# Patient Record
Sex: MALE | Race: WHITE | NOT HISPANIC OR LATINO | Employment: FULL TIME | URBAN - METROPOLITAN AREA
[De-identification: names, ages, dates, MRNs, and addresses within clinical notes are randomized per-mention and may not be internally consistent; named-entity substitution may affect disease eponyms.]

---

## 2017-01-09 ENCOUNTER — OFFICE VISIT - HEALTHEAST (OUTPATIENT)
Dept: BEHAVIORAL HEALTH | Facility: CLINIC | Age: 42
End: 2017-01-09

## 2017-01-09 DIAGNOSIS — F33.1 MAJOR DEPRESSIVE DISORDER, RECURRENT EPISODE, MODERATE WITH ANXIOUS DISTRESS (H): ICD-10-CM

## 2017-01-09 DIAGNOSIS — F10.10 ALCOHOL ABUSE: ICD-10-CM

## 2017-01-16 ENCOUNTER — AMBULATORY - HEALTHEAST (OUTPATIENT)
Dept: BEHAVIORAL HEALTH | Facility: CLINIC | Age: 42
End: 2017-01-16

## 2017-01-23 ENCOUNTER — OFFICE VISIT - HEALTHEAST (OUTPATIENT)
Dept: BEHAVIORAL HEALTH | Facility: CLINIC | Age: 42
End: 2017-01-23

## 2017-01-23 DIAGNOSIS — F10.10 ALCOHOL ABUSE: ICD-10-CM

## 2017-01-23 DIAGNOSIS — F33.1 MAJOR DEPRESSIVE DISORDER, RECURRENT EPISODE, MODERATE WITH ANXIOUS DISTRESS (H): ICD-10-CM

## 2017-02-13 ENCOUNTER — OFFICE VISIT - HEALTHEAST (OUTPATIENT)
Dept: BEHAVIORAL HEALTH | Facility: CLINIC | Age: 42
End: 2017-02-13

## 2017-02-13 ENCOUNTER — AMBULATORY - HEALTHEAST (OUTPATIENT)
Dept: BEHAVIORAL HEALTH | Facility: CLINIC | Age: 42
End: 2017-02-13

## 2017-02-13 DIAGNOSIS — F33.1 MAJOR DEPRESSIVE DISORDER, RECURRENT EPISODE, MODERATE WITH ANXIOUS DISTRESS (H): ICD-10-CM

## 2017-02-27 ENCOUNTER — OFFICE VISIT - HEALTHEAST (OUTPATIENT)
Dept: BEHAVIORAL HEALTH | Facility: CLINIC | Age: 42
End: 2017-02-27

## 2017-02-27 DIAGNOSIS — F33.1 MAJOR DEPRESSIVE DISORDER, RECURRENT EPISODE, MODERATE WITH ANXIOUS DISTRESS (H): ICD-10-CM

## 2017-04-03 ENCOUNTER — OFFICE VISIT - HEALTHEAST (OUTPATIENT)
Dept: BEHAVIORAL HEALTH | Facility: CLINIC | Age: 42
End: 2017-04-03

## 2017-04-03 DIAGNOSIS — F33.1 MAJOR DEPRESSIVE DISORDER, RECURRENT EPISODE, MODERATE WITH ANXIOUS DISTRESS (H): ICD-10-CM

## 2017-04-03 DIAGNOSIS — F10.10 ALCOHOL ABUSE: ICD-10-CM

## 2017-04-17 ENCOUNTER — OFFICE VISIT - HEALTHEAST (OUTPATIENT)
Dept: BEHAVIORAL HEALTH | Facility: CLINIC | Age: 42
End: 2017-04-17

## 2017-04-17 DIAGNOSIS — F33.1 MAJOR DEPRESSIVE DISORDER, RECURRENT EPISODE, MODERATE WITH ANXIOUS DISTRESS (H): ICD-10-CM

## 2017-04-17 DIAGNOSIS — F10.10 ALCOHOL ABUSE: ICD-10-CM

## 2017-05-01 ENCOUNTER — OFFICE VISIT - HEALTHEAST (OUTPATIENT)
Dept: BEHAVIORAL HEALTH | Facility: CLINIC | Age: 42
End: 2017-05-01

## 2017-05-01 DIAGNOSIS — F10.10 ALCOHOL ABUSE: ICD-10-CM

## 2017-05-01 DIAGNOSIS — F33.1 MAJOR DEPRESSIVE DISORDER, RECURRENT EPISODE, MODERATE WITH ANXIOUS DISTRESS (H): ICD-10-CM

## 2017-06-05 ENCOUNTER — OFFICE VISIT - HEALTHEAST (OUTPATIENT)
Dept: BEHAVIORAL HEALTH | Facility: CLINIC | Age: 42
End: 2017-06-05

## 2017-06-05 DIAGNOSIS — F33.1 MAJOR DEPRESSIVE DISORDER, RECURRENT EPISODE, MODERATE WITH ANXIOUS DISTRESS (H): ICD-10-CM

## 2017-06-12 ENCOUNTER — OFFICE VISIT - HEALTHEAST (OUTPATIENT)
Dept: BEHAVIORAL HEALTH | Facility: CLINIC | Age: 42
End: 2017-06-12

## 2017-06-12 DIAGNOSIS — F33.1 MAJOR DEPRESSIVE DISORDER, RECURRENT EPISODE, MODERATE WITH ANXIOUS DISTRESS (H): ICD-10-CM

## 2017-06-12 DIAGNOSIS — F10.10 ALCOHOL ABUSE: ICD-10-CM

## 2017-06-26 ENCOUNTER — OFFICE VISIT - HEALTHEAST (OUTPATIENT)
Dept: BEHAVIORAL HEALTH | Facility: CLINIC | Age: 42
End: 2017-06-26

## 2017-06-26 DIAGNOSIS — F33.1 MAJOR DEPRESSIVE DISORDER, RECURRENT EPISODE, MODERATE WITH ANXIOUS DISTRESS (H): ICD-10-CM

## 2017-06-26 DIAGNOSIS — F10.10 ALCOHOL ABUSE: ICD-10-CM

## 2017-07-10 ENCOUNTER — OFFICE VISIT - HEALTHEAST (OUTPATIENT)
Dept: BEHAVIORAL HEALTH | Facility: CLINIC | Age: 42
End: 2017-07-10

## 2017-07-10 DIAGNOSIS — F10.10 ALCOHOL ABUSE: ICD-10-CM

## 2017-07-10 DIAGNOSIS — F33.1 MAJOR DEPRESSIVE DISORDER, RECURRENT EPISODE, MODERATE WITH ANXIOUS DISTRESS (H): ICD-10-CM

## 2017-07-17 ENCOUNTER — COMMUNICATION - HEALTHEAST (OUTPATIENT)
Dept: BEHAVIORAL HEALTH | Facility: CLINIC | Age: 42
End: 2017-07-17

## 2017-07-30 ENCOUNTER — AMBULATORY - HEALTHEAST (OUTPATIENT)
Dept: BEHAVIORAL HEALTH | Facility: CLINIC | Age: 42
End: 2017-07-30

## 2017-07-31 ENCOUNTER — OFFICE VISIT - HEALTHEAST (OUTPATIENT)
Dept: BEHAVIORAL HEALTH | Facility: CLINIC | Age: 42
End: 2017-07-31

## 2017-07-31 DIAGNOSIS — F10.10 ALCOHOL ABUSE: ICD-10-CM

## 2017-07-31 DIAGNOSIS — F33.1 MAJOR DEPRESSIVE DISORDER, RECURRENT EPISODE, MODERATE WITH ANXIOUS DISTRESS (H): ICD-10-CM

## 2017-08-07 ENCOUNTER — OFFICE VISIT - HEALTHEAST (OUTPATIENT)
Dept: BEHAVIORAL HEALTH | Facility: CLINIC | Age: 42
End: 2017-08-07

## 2017-08-07 DIAGNOSIS — F10.10 ALCOHOL ABUSE: ICD-10-CM

## 2017-08-07 DIAGNOSIS — F33.1 MAJOR DEPRESSIVE DISORDER, RECURRENT EPISODE, MODERATE WITH ANXIOUS DISTRESS (H): ICD-10-CM

## 2017-08-14 ENCOUNTER — OFFICE VISIT - HEALTHEAST (OUTPATIENT)
Dept: BEHAVIORAL HEALTH | Facility: CLINIC | Age: 42
End: 2017-08-14

## 2017-08-14 DIAGNOSIS — F33.1 MAJOR DEPRESSIVE DISORDER, RECURRENT EPISODE, MODERATE WITH ANXIOUS DISTRESS (H): ICD-10-CM

## 2017-08-14 DIAGNOSIS — F10.10 ALCOHOL ABUSE: ICD-10-CM

## 2017-08-28 ENCOUNTER — OFFICE VISIT - HEALTHEAST (OUTPATIENT)
Dept: BEHAVIORAL HEALTH | Facility: CLINIC | Age: 42
End: 2017-08-28

## 2017-08-28 DIAGNOSIS — F33.1 MAJOR DEPRESSIVE DISORDER, RECURRENT EPISODE, MODERATE WITH ANXIOUS DISTRESS (H): ICD-10-CM

## 2017-09-11 ENCOUNTER — OFFICE VISIT - HEALTHEAST (OUTPATIENT)
Dept: BEHAVIORAL HEALTH | Facility: CLINIC | Age: 42
End: 2017-09-11

## 2017-09-11 DIAGNOSIS — F10.10 ALCOHOL ABUSE: ICD-10-CM

## 2017-09-11 DIAGNOSIS — F33.1 MAJOR DEPRESSIVE DISORDER, RECURRENT EPISODE, MODERATE WITH ANXIOUS DISTRESS (H): ICD-10-CM

## 2017-10-02 ENCOUNTER — OFFICE VISIT - HEALTHEAST (OUTPATIENT)
Dept: BEHAVIORAL HEALTH | Facility: CLINIC | Age: 42
End: 2017-10-02

## 2017-10-02 DIAGNOSIS — F33.1 MAJOR DEPRESSIVE DISORDER, RECURRENT EPISODE, MODERATE WITH ANXIOUS DISTRESS (H): ICD-10-CM

## 2017-10-16 ENCOUNTER — OFFICE VISIT - HEALTHEAST (OUTPATIENT)
Dept: BEHAVIORAL HEALTH | Facility: CLINIC | Age: 42
End: 2017-10-16

## 2017-10-16 DIAGNOSIS — F33.1 MAJOR DEPRESSIVE DISORDER, RECURRENT EPISODE, MODERATE WITH ANXIOUS DISTRESS (H): ICD-10-CM

## 2017-10-16 DIAGNOSIS — F10.10 ALCOHOL ABUSE: ICD-10-CM

## 2017-10-30 ENCOUNTER — OFFICE VISIT - HEALTHEAST (OUTPATIENT)
Dept: BEHAVIORAL HEALTH | Facility: CLINIC | Age: 42
End: 2017-10-30

## 2017-10-30 DIAGNOSIS — F33.1 MAJOR DEPRESSIVE DISORDER, RECURRENT EPISODE, MODERATE WITH ANXIOUS DISTRESS (H): ICD-10-CM

## 2017-10-30 DIAGNOSIS — F10.10 ALCOHOL ABUSE: ICD-10-CM

## 2017-11-06 ENCOUNTER — OFFICE VISIT - HEALTHEAST (OUTPATIENT)
Dept: BEHAVIORAL HEALTH | Facility: CLINIC | Age: 42
End: 2017-11-06

## 2017-11-06 DIAGNOSIS — F33.1 MAJOR DEPRESSIVE DISORDER, RECURRENT EPISODE, MODERATE WITH ANXIOUS DISTRESS (H): ICD-10-CM

## 2017-11-06 DIAGNOSIS — F10.10 ALCOHOL ABUSE: ICD-10-CM

## 2017-11-20 ENCOUNTER — OFFICE VISIT - HEALTHEAST (OUTPATIENT)
Dept: BEHAVIORAL HEALTH | Facility: CLINIC | Age: 42
End: 2017-11-20

## 2017-11-20 DIAGNOSIS — F10.10 ALCOHOL ABUSE: ICD-10-CM

## 2017-11-20 DIAGNOSIS — F33.1 MAJOR DEPRESSIVE DISORDER, RECURRENT EPISODE, MODERATE WITH ANXIOUS DISTRESS (H): ICD-10-CM

## 2017-12-04 ENCOUNTER — OFFICE VISIT - HEALTHEAST (OUTPATIENT)
Dept: BEHAVIORAL HEALTH | Facility: CLINIC | Age: 42
End: 2017-12-04

## 2017-12-04 DIAGNOSIS — F10.10 ALCOHOL ABUSE: ICD-10-CM

## 2017-12-04 DIAGNOSIS — F33.1 MAJOR DEPRESSIVE DISORDER, RECURRENT EPISODE, MODERATE WITH ANXIOUS DISTRESS (H): ICD-10-CM

## 2017-12-18 ENCOUNTER — AMBULATORY - HEALTHEAST (OUTPATIENT)
Dept: BEHAVIORAL HEALTH | Facility: CLINIC | Age: 42
End: 2017-12-18

## 2017-12-18 ENCOUNTER — OFFICE VISIT - HEALTHEAST (OUTPATIENT)
Dept: BEHAVIORAL HEALTH | Facility: CLINIC | Age: 42
End: 2017-12-18

## 2017-12-18 DIAGNOSIS — F10.10 ALCOHOL ABUSE: ICD-10-CM

## 2017-12-18 DIAGNOSIS — F33.1 MAJOR DEPRESSIVE DISORDER, RECURRENT EPISODE, MODERATE WITH ANXIOUS DISTRESS (H): ICD-10-CM

## 2018-01-08 ENCOUNTER — OFFICE VISIT - HEALTHEAST (OUTPATIENT)
Dept: BEHAVIORAL HEALTH | Facility: CLINIC | Age: 43
End: 2018-01-08

## 2018-01-08 DIAGNOSIS — F33.1 MAJOR DEPRESSIVE DISORDER, RECURRENT EPISODE, MODERATE WITH ANXIOUS DISTRESS (H): ICD-10-CM

## 2018-01-08 DIAGNOSIS — F10.10 ALCOHOL ABUSE: ICD-10-CM

## 2018-01-29 ENCOUNTER — OFFICE VISIT - HEALTHEAST (OUTPATIENT)
Dept: BEHAVIORAL HEALTH | Facility: CLINIC | Age: 43
End: 2018-01-29

## 2018-01-29 DIAGNOSIS — F10.10 ALCOHOL ABUSE: ICD-10-CM

## 2018-01-29 DIAGNOSIS — F33.1 MAJOR DEPRESSIVE DISORDER, RECURRENT EPISODE, MODERATE WITH ANXIOUS DISTRESS (H): ICD-10-CM

## 2018-02-12 ENCOUNTER — AMBULATORY - HEALTHEAST (OUTPATIENT)
Dept: BEHAVIORAL HEALTH | Facility: CLINIC | Age: 43
End: 2018-02-12

## 2018-02-26 ENCOUNTER — OFFICE VISIT - HEALTHEAST (OUTPATIENT)
Dept: BEHAVIORAL HEALTH | Facility: CLINIC | Age: 43
End: 2018-02-26

## 2018-02-26 DIAGNOSIS — F10.10 ALCOHOL ABUSE: ICD-10-CM

## 2018-02-26 DIAGNOSIS — F33.1 MAJOR DEPRESSIVE DISORDER, RECURRENT EPISODE, MODERATE WITH ANXIOUS DISTRESS (H): ICD-10-CM

## 2018-03-26 ENCOUNTER — OFFICE VISIT - HEALTHEAST (OUTPATIENT)
Dept: BEHAVIORAL HEALTH | Facility: CLINIC | Age: 43
End: 2018-03-26

## 2018-03-26 DIAGNOSIS — F33.1 MAJOR DEPRESSIVE DISORDER, RECURRENT EPISODE, MODERATE WITH ANXIOUS DISTRESS (H): ICD-10-CM

## 2018-03-26 DIAGNOSIS — F10.10 ALCOHOL ABUSE: ICD-10-CM

## 2018-04-09 ENCOUNTER — OFFICE VISIT - HEALTHEAST (OUTPATIENT)
Dept: BEHAVIORAL HEALTH | Facility: CLINIC | Age: 43
End: 2018-04-09

## 2018-04-09 DIAGNOSIS — F33.1 MAJOR DEPRESSIVE DISORDER, RECURRENT EPISODE, MODERATE WITH ANXIOUS DISTRESS (H): ICD-10-CM

## 2018-04-09 DIAGNOSIS — F10.10 ALCOHOL ABUSE: ICD-10-CM

## 2018-04-23 ENCOUNTER — OFFICE VISIT - HEALTHEAST (OUTPATIENT)
Dept: BEHAVIORAL HEALTH | Facility: CLINIC | Age: 43
End: 2018-04-23

## 2018-04-23 DIAGNOSIS — F10.10 ALCOHOL ABUSE: ICD-10-CM

## 2018-04-23 DIAGNOSIS — F33.1 MAJOR DEPRESSIVE DISORDER, RECURRENT EPISODE, MODERATE WITH ANXIOUS DISTRESS (H): ICD-10-CM

## 2018-05-07 ENCOUNTER — OFFICE VISIT - HEALTHEAST (OUTPATIENT)
Dept: BEHAVIORAL HEALTH | Facility: CLINIC | Age: 43
End: 2018-05-07

## 2018-05-07 DIAGNOSIS — F33.1 MAJOR DEPRESSIVE DISORDER, RECURRENT EPISODE, MODERATE WITH ANXIOUS DISTRESS (H): ICD-10-CM

## 2018-05-07 DIAGNOSIS — F10.10 ALCOHOL ABUSE: ICD-10-CM

## 2018-05-14 ENCOUNTER — OFFICE VISIT - HEALTHEAST (OUTPATIENT)
Dept: BEHAVIORAL HEALTH | Facility: CLINIC | Age: 43
End: 2018-05-14

## 2018-05-14 DIAGNOSIS — F33.1 MAJOR DEPRESSIVE DISORDER, RECURRENT EPISODE, MODERATE WITH ANXIOUS DISTRESS (H): ICD-10-CM

## 2018-05-14 DIAGNOSIS — F10.10 ALCOHOL ABUSE: ICD-10-CM

## 2018-06-04 ENCOUNTER — OFFICE VISIT - HEALTHEAST (OUTPATIENT)
Dept: BEHAVIORAL HEALTH | Facility: CLINIC | Age: 43
End: 2018-06-04

## 2018-06-04 DIAGNOSIS — F33.1 MAJOR DEPRESSIVE DISORDER, RECURRENT EPISODE, MODERATE WITH ANXIOUS DISTRESS (H): ICD-10-CM

## 2018-08-06 ENCOUNTER — OFFICE VISIT - HEALTHEAST (OUTPATIENT)
Dept: BEHAVIORAL HEALTH | Facility: CLINIC | Age: 43
End: 2018-08-06

## 2018-08-06 DIAGNOSIS — F33.1 MAJOR DEPRESSIVE DISORDER, RECURRENT EPISODE, MODERATE WITH ANXIOUS DISTRESS (H): ICD-10-CM

## 2018-08-06 DIAGNOSIS — F10.10 ALCOHOL ABUSE: ICD-10-CM

## 2018-08-20 ENCOUNTER — OFFICE VISIT - HEALTHEAST (OUTPATIENT)
Dept: BEHAVIORAL HEALTH | Facility: CLINIC | Age: 43
End: 2018-08-20

## 2018-08-20 ENCOUNTER — AMBULATORY - HEALTHEAST (OUTPATIENT)
Dept: BEHAVIORAL HEALTH | Facility: CLINIC | Age: 43
End: 2018-08-20

## 2018-08-20 DIAGNOSIS — F33.1 MAJOR DEPRESSIVE DISORDER, RECURRENT EPISODE, MODERATE WITH ANXIOUS DISTRESS (H): ICD-10-CM

## 2018-09-11 ENCOUNTER — AMBULATORY - HEALTHEAST (OUTPATIENT)
Dept: BEHAVIORAL HEALTH | Facility: CLINIC | Age: 43
End: 2018-09-11

## 2018-09-24 ENCOUNTER — OFFICE VISIT - HEALTHEAST (OUTPATIENT)
Dept: BEHAVIORAL HEALTH | Facility: CLINIC | Age: 43
End: 2018-09-24

## 2018-09-24 DIAGNOSIS — F33.1 MAJOR DEPRESSIVE DISORDER, RECURRENT EPISODE, MODERATE WITH ANXIOUS DISTRESS (H): ICD-10-CM

## 2018-09-24 DIAGNOSIS — F10.10 ALCOHOL ABUSE: ICD-10-CM

## 2018-10-08 ENCOUNTER — OFFICE VISIT - HEALTHEAST (OUTPATIENT)
Dept: BEHAVIORAL HEALTH | Facility: CLINIC | Age: 43
End: 2018-10-08

## 2018-10-08 DIAGNOSIS — F10.10 ALCOHOL ABUSE: ICD-10-CM

## 2018-10-08 DIAGNOSIS — F33.1 MAJOR DEPRESSIVE DISORDER, RECURRENT EPISODE, MODERATE WITH ANXIOUS DISTRESS (H): ICD-10-CM

## 2018-10-29 ENCOUNTER — OFFICE VISIT - HEALTHEAST (OUTPATIENT)
Dept: BEHAVIORAL HEALTH | Facility: CLINIC | Age: 43
End: 2018-10-29

## 2018-10-29 DIAGNOSIS — F33.1 MAJOR DEPRESSIVE DISORDER, RECURRENT EPISODE, MODERATE WITH ANXIOUS DISTRESS (H): ICD-10-CM

## 2018-10-29 DIAGNOSIS — F10.10 ALCOHOL ABUSE: ICD-10-CM

## 2019-03-18 ENCOUNTER — OFFICE VISIT - HEALTHEAST (OUTPATIENT)
Dept: BEHAVIORAL HEALTH | Facility: CLINIC | Age: 44
End: 2019-03-18

## 2019-03-18 ENCOUNTER — AMBULATORY - HEALTHEAST (OUTPATIENT)
Dept: BEHAVIORAL HEALTH | Facility: CLINIC | Age: 44
End: 2019-03-18

## 2019-03-18 DIAGNOSIS — F33.1 MAJOR DEPRESSIVE DISORDER, RECURRENT EPISODE, MODERATE WITH ANXIOUS DISTRESS (H): ICD-10-CM

## 2019-03-18 DIAGNOSIS — F10.10 ALCOHOL ABUSE: ICD-10-CM

## 2019-09-17 ENCOUNTER — COMMUNICATION - HEALTHEAST (OUTPATIENT)
Dept: BEHAVIORAL HEALTH | Facility: CLINIC | Age: 44
End: 2019-09-17

## 2019-09-17 ENCOUNTER — AMBULATORY - HEALTHEAST (OUTPATIENT)
Dept: BEHAVIORAL HEALTH | Facility: CLINIC | Age: 44
End: 2019-09-17

## 2021-06-01 NOTE — PROGRESS NOTES
Patient was last seen in this office by this provider March 18, 2019.  There was his first and only therapy session for the calendar year 2019.  Prior to that patient had been coming in 1 time monthly last seen October 2017.  Due to the increasing time between sessions patient is discharged from therapy as he has not scheduled any follow-up appointments.  Patient is able to resume individual psychotherapy if he just should call to schedule an appointment.  Call was placed to the patient to discuss the discharge from therapy and message was left for him to contact the clinic and/or this provider if he had any questions regarding the discharge.

## 2021-06-08 NOTE — PROGRESS NOTES
Mental Health Visit Note    1/23/2017    Start time: 0700    Stop Time: 0752   Session # 2    Primitivo Melchor is a 41 y.o. male is being seen today for    Chief Complaint   Patient presents with     Depression     Anxiety     Alcohol Problem     Follow-up   .     New symptoms or complaints: Patient comes to therapy today to continue to address ongoing issues associated with major depressive disorder with anxious features mild.  In today's session patient continues to report on and off success with his addressing his alcohol abuse issues.  Patient indicated he is still coming to understand what happened over the Tessy holidays and his difficulty with depression and anxiety.  As the session progressed patient was able to articulate his thoughts and feelings with regard to a significant relationship he is attempting to complete and acknowledges that his depression and anxiety have to do with this relationship more so than any other issue in his life.  Patient processed thoughts feelings and responses throughout the session.    Functional Impairment:   Personal: 3  Family: 0  Work: 2  Social:2    Clinical assessment of mental status: Patient is alert and oriented ×3 with no evidence of impairment in orientation memory or judgment.  Fund of knowledge is adequate.  Thought content is absent hallucinations delusions and psychosis.  Thought process, concentration, speech and language, attention, and motor activity are all within normal limits.  Mood was stable.  Affect was congruent with content of speech.  He is well-groomed his attire is appropriate and he appears his stated age.  He maintains positive eye contact and was cooperative throughout the session.    Suicidal/Homicidal Ideation present: None Reported This Session    Patient's impression of their current status: Patient states it helpful to come in here I am so critical of myself and my perception of being weak and I get scared about being alone but I know  that this relationship is not healthy for me.  He didn't see how much progress I have been making and where some of my beliefs about myself are getting me into trouble as very helpful    Therapist impression of patients current state: This patient continues to make good progress he is able to look at his self talk and beliefs beginning to make some changes if he continues to participate in his AA program which is very helpful for him as he continues to move towards finding alternative coping strategies within the 12 step program.    Type of psychotherapeutic technique provided: Insight oriented, Client centered, Solution-focused and CBT    Progress toward short term goals:Progress as expected, Patient continues to explore underlying beliefs about self and others learning to replace self talk with positive reinforcements.  This is leading to changes in his behavior.    Review of long term goals: Not done at today's visit and Date of last review 9/6/2016    Diagnosis:   1. Major depressive disorder, recurrent episode, moderate with anxious distress    2. Alcohol abuse        Plan and Follow up: Patient is scheduled for therapy on an every other week basis.  He continues to follow all of the medication recommendations as prescribed.  Additionally continues to attend AA on a weekly basis and is continuing to look for ways to explore his own understanding of spirituality and the 12 step program.  He continues to journal thoughts feelings and observations for discussion each session.      Discharge Criteria/Planning: Patient will continue with follow-up until therapy can be discontinued without return of signs and symptoms.    Nelia Faustin 1/23/2017      This note was created with help of Dragon dictation software. Grammatical / typing errors are not intentional and inherent to the software.

## 2021-06-08 NOTE — PROGRESS NOTES
Outpatient Mental Health Treatment Plan    Name:  Primitivo Melchor  :  1975  MRN:  [id number]    Treatment Plan:  Updated Treatment Plan  Intake/initial treatment plan date:  2017  Benefit and risks and alternatives have been discussed: Yes  Is this treatment appropriate with minimal intrusion/restrictions: Yes  Estimated duration of treatment:  Approximately 12 sessions.  Anticipated frequency of services:  Every 2 weeks  Necessity for frequency: This frequency is needed to establish therapeutic goals and for continuity of care in order to monitor progress.  Necessity for treatment: To address cognitive, behavioral, and/or emotional barriers in order to work toward goals and to improve quality of life.    Plan:           ?   ? Anxiety    Goal:  Decrease average anxiety level from 6   to 3.   Strategies: ? [x]Learn and practice relaxation techniques and other coping        strategies (e.g., thought stopping, reframing, meditation)     ? [x] Increase involvement in meaningful activities     ? [x] Discuss sleep hygiene     ? [x] Explore thoughts and expectations about self and others     ? [x] Identify and monitor triggers for panic/anxiety symptoms     ? [x] Implement physical activity routine (with physician approval)     ? [x] Consider introduction of bibliotherapy and/or videos     ? [x] Continue compliance with medical treatment plan (or explore          barriers)                                       [x]        ? Depression    Goal:  Decrease average depression level from 5 to 2.   Strategies:    ?[x] Decrease social isolation     [x] Increase involvement in meaningful activities     ?[x] Discuss sleep hygiene     ?[x] Explore thoughts and expectations about self and others     ?[x] Process grief (loss of significant person, independence, role,        etc.)     ?[x] Assess for suicide risk     ?[x] Implement physical activity routine (with physician approval)     [x] Consider introduction of  bibliotherapy and/or videos     [x] Continue compliance with medical treatment plan (or explore         barriers)       ?  ?Degree to which this is a problem: 2  Degree to which goal is met: 2         Functional Impairment:  1=Not at all/Rarely  2=Some days  3=Most Days  4=Every Day    Personal : 2  Family : 2  Social : 2   Work/school : 2    Diagnosis:   Major depressive disorder, recurrent, moderate; Generalized Anxiety disorder    WHODAS 2.0 12-item version 12      Scores presented in qualifiers to represent level of disability.  MILD Problem (slight, low, ...) 5-24%    H1= 15  H2= 1  H3= 3        Clinical assessments and measures completed:. CHASE-7 and PHQ-9     Strengths:  Intelligence, willingness to change, family support, insight, commitment to self,determination, compassion, kindness, humor, courage,  Limitations:  self doubt, self critical,   Cultural Considerations: early childhood family of origin issues    Persons responsible for this plan: Patient and Provider            Psychotherapist Signature           Patient Signature:              Guardian Signature             Provider: Performed and documented by SONIDO Azevedo   Date:  2/13/2017      This note was created with help of Dragon dictation software.  Grammatical / typing errors are not intentional and inherent to the software.

## 2021-06-08 NOTE — PROGRESS NOTES
Mental Health Visit Note    1/9/2017    Start time: 0700    Stop Time: 0752   Session # 1    Primitivo Melchor is a 41 y.o. male is being seen today for    Chief Complaint   Patient presents with     Depression     Anxiety     Alcohol Problem     Follow-up   .     New symptoms or complaints: Patient comes to therapy today to address ongoing issues associated with major depressive disorder with anxious features.  Additionally he continues to address alcohol abuse issues.  In today's session patient reports he continues to go to his weekly AA meetings and continues to drink.  Patient indicates he continues to struggle with some relationship issues and wanted to report in on progress made with both these issues.  Patient's thoughts feelings and responses were discussed throughout session.    Functional Impairment:   Personal: 2  Family: 0  Work: 2  Social:2    Clinical assessment of mental status: Patient is alert and oriented ×3 with no evidence of impairment in orientation memory or judgment.  Fund of knowledge is adequate.  Thought content is absent hallucinations delusions and psychosis.  Thought process, concentration, speech and language, attention, and motor activity are all within normal limits.  Affect is congruent with content of speech she was tearful throughout the session.  He maintains positive eye contact and is cooperative throughout the session.  Mood is stable.  He is well groomed his attire is appropriate and he appears his age.    Suicidal/Homicidal Ideation present: None Reported This Session    Patient's impression of their current status: Patient states I really needed to get that emotional expression out of my system and given holding it for a couple of weeks.  I know that I'm on track by addressing these long-standing dependency issues and codependency issues in relationships is really weren't turning my attention now    Therapist impression of patients current state: This patient continues to  make very good progress his insight and awareness about his own behaviors triggers and dependency issues is becoming clearer to him as well as his desire to continue to stabilize and strengthen his recovery program.    Type of psychotherapeutic technique provided: Insight oriented, Client centered, Solution-focused and CBT    Progress toward short term goals:Progress as expected, Patient continues to address underlying issues of dependency and self respect.    Review of long term goals: Not done at today's visit and Date of last review 9/26/2016    Diagnosis:   1. Major depressive disorder, recurrent episode, moderate with anxious distress    2. Alcohol abuse        Plan and Follow up: Patient is scheduled for therapy and weekly basis he will continue to follow all M.D. medication recommendations as prescribed.  Additionally patient will continue to attend AA on a weekly basis and we'll add one more meeting this week.  Patient continues to journal thoughts feelings and observations for discussion each session.  He is also working with information found in breathing underwater 12 steps and spirituality.      Discharge Criteria/Planning: Patient will continue with follow-up until therapy can be discontinued without return of signs and symptoms.    Nelia Faustin 1/9/2017      This note was created with help of Dragon dictation software. Grammatical / typing errors are not intentional and inherent to the software.

## 2021-06-08 NOTE — PROGRESS NOTES
Left message for Pt. To inform that he does not have any future appointments scheduled.  Provided number to clinic, [phone], for him to call to schedule his next appointment.

## 2021-06-08 NOTE — PROGRESS NOTES
Mental Health Visit Note    2/13/2017    Start time: 0700    Stop Time: 0752   Session # 3    Primitivo Melchor is a 41 y.o. male is being seen today for    Chief Complaint   Patient presents with     Anxiety     Depression     Alcohol Problem     Follow-up   .     New symptoms or complaints: Patient comes to therapy today to continue to address ongoing issues associated with major depressive disorder recurrent moderate to mild, generalized anxiety disorder.  He is receiving support for alcohol abuse.  Patient reports continued sense of well-being and he continues to apply for promotions at work, relationship issues were explored at length much of the source of his anxiety and depression are related to his beliefs about self and others with regard to these significant relationships.    Functional Impairment:   Personal: 2  Family: 2  Work: 2  Social:2    Clinical assessment of mental status: Patient is alert and oriented ×3 with no evidence of impairment in orientation memory or judgment.  Fund of knowledge is adequate.  Thought process, concentration, speech and language, attention, and motor activity are all within normal limits.  Thought content is absent hallucinations delusions and psychosis.  Mood is stable.  Eye contact is appropriate and he was cooperative throughout the session.  He is well-groomed his tires appropriately appears his stated age.    Suicidal/Homicidal Ideation present: None Reported This Session    Patient's impression of their current status: I feel I have made a tremendous amount of progress I find that I really have missed my Thursday night meetings and I'm looking forward to getting back together with the men's group and find that all the work we've been doing over the last year and some months has been helpful terms of reducing my overall symptoms.  Every time I come here I take a look at some of the beliefs I hold and it's making more and more sense and I feel that changes are  happening in my life I felt good about what I'm doing    Therapist impression of patients current state: This patient continues to make good progress his insight is growing his commitment to change is strengthening and he follows through with assignments and continues to process information in the sessions in between sessions as well as experiencing changes in behaviors.  He is doing well    Type of psychotherapeutic technique provided: Insight oriented, Client centered, Solution-focused and CBT    Progress toward short term goals:Progress as expected, Patient continues to strengthen his connection to himself and is resolving long-held issues of abandonment and self worth    Review of long term goals: Long-term goals reviewed 2/13/2017 and Treatment Plan updated    Diagnosis:   1. Major depressive disorder, recurrent episode, moderate with anxious distress        Plan and Follow up: Patient is scheduled for therapy in 2 weeks he will continue to follow M.D. medication recommendations as prescribed.  He continues to attend his AA meeting continues with his personal training as well as journaling thoughts feelings and observations for discussion next week.      Discharge Criteria/Planning: Patient will continue with follow-up until therapy can be discontinued without return of signs and symptoms.    Nelia Faustin 2/13/2017      This note was created with help of Dragon dictation software. Grammatical / typing errors are not intentional and inherent to the software.

## 2021-06-09 NOTE — PROGRESS NOTES
Mental Health Visit Note    2/27/2017    Start time: 0700    Stop Time: 0752   Session # 4    Primitivo Melchor is a 42 y.o. male is being seen today for    Chief Complaint   Patient presents with     Anxiety     Depression     Follow-up   .     New symptoms or complaints: Patient comes to therapy today to continue to address ongoing issues associated with major depressive disorder recurrent episode moderate with anxious distress.  At today's session patient reports a period of 2 weeks where she did not actively work his recovery program and reports an increase in agitation and anxiety.  I am really able to see that if I'm not actively working a program things don't go as well for me.  Patient wanted to discuss changes in his workplace and relationship issues.  Patient's thoughts feelings and responses were discussed throughout session    Functional Impairment:   Personal: 2  Family: 1  Work: 2  Social:2    Clinical assessment of mental status: Patient is alert and oriented ×3 with no evidence of impairment in judgment.  Fund of knowledge is adequate.  Mood is stable.  Eye contact is appropriate he was cooperative throughout session.  He is well-groomed his attire is appropriately pierced his stated age.  Thought content is absent of hallucinations delusions and psychosis.  Thought process, concentration, speech and language, attention, and motor activity are all within normal limits.  Affect is congruent with content of speech.  Anxiety was noted to difficulties with regulating his breathing and he was able to use mindfulness technique session to calm down thought process is clear.    Suicidal/Homicidal Ideation present: None Reported This Session    Patient's impression of their current status: Patient states none of this is very easy for me to look at what I can see evidence of self sabotaging behaviors that normally I would blame other people for.  It's really good but painful at the same time to look  at    Therapist impression of patients current state: This patient continues to make good progress he is able to look at his choices see the consequences rather than feel at the mercy of those consequences.  He is starting to gain insight and understanding as well as accepting responsibility for the behaviors and choices he engages in.    Type of psychotherapeutic technique provided: Insight oriented, Client centered, Solution-focused and CBT    Progress toward short term goals:Progress as expected, Patient continues to expand his own awareness take greater responsibility for his choices.    Review of long term goals: Not done at today's visit and Date of last review 2/13/2017    Diagnosis:   1. Major depressive disorder, recurrent episode, moderate with anxious distress        Plan and Follow up: Patient is scheduled for therapy every 2 weeks.  He continues to following medication recommendations as prescribed.  Additionally he will attend 2 AA meetings this week as well as begin working with information however now in the 12 step program.  He will continue to journal thoughts feelings and observations for discussion next session.      Discharge Criteria/Planning: Patient will continue with follow-up until therapy can be discontinued without return of signs and symptoms.    Nelia Faustin 2/27/2017      This note was created with help of Dragon dictation software. Grammatical / typing errors are not intentional and inherent to the software.

## 2021-06-09 NOTE — PROGRESS NOTES
Mental Health Visit Note    4/3/2017    Start time: 0710    Stop Time: 0800   Session # 5    Primitivo Melchor is a 42 y.o. male is being seen today for    Chief Complaint   Patient presents with     Depression     Anxiety     Alcohol Problem     Follow-up   .     New symptoms or complaints: Pt comes to therapy to address ongoing issues with Depression and anxiety associated with major life changes in work and relationships.  He additionally continues to address the place and function of alcohol in his life.  Pt's thoughts, feelings and responses were explored throughout the session.    Functional Impairment:   Personal: 2  Family: 0  Work: 2  Social:2    Clinical assessment of mental status: Pt is alert and oriented x3 with no evidence of impairment in memory, judgement or orientation.  He is well groomed and appears his stated age. His attire is appropriate for the weather.  He is cooperative and maintains positive eye contact.  Fund of knowledge is adequate and his mood is stable.  Affect is congruent with the content of speech and at times he was tearful and anxious.  He was able to regulate mood with deep breathing and relaxation techniques. His speech and language are within normal limits.       Suicidal/Homicidal Ideation present: None Reported This Session    Patient's impression of their current status: On the way over here I was thinking that its too hard to deal with all of this emotional situations, but once I am here I can see that I can cope.  I know the changes I need to make and this helps support those changes.  Missing about a month of contact is not helpful.    Therapist impression of patients current state: This pt continues to make progress forward, his efforts are impeeded by missed appointments, although he does get insight into what works for him and what is not working as well.  He remains committed to change and overall his symptoms of depression and anxiety are not as severe as they had  been.  His coping abilities and problem solving skills have improved and still support therapy reduction to 2x monthly.    Type of psychotherapeutic technique provided: Insight oriented, Client centered, Solution-focused and CBT    Progress toward short term goals:Progress as expected, pt continues to gain insight into maladaptive coping strategies and address underlying issues of anxiety and depression.    Review of long term goals: Not done at today's visit and Date of last review 2/13/2017    Diagnosis:   1. Major depressive disorder, recurrent episode, moderate with anxious distress    2. Alcohol abuse        Plan and Follow up: Pt is scheduled for therapy every two weeks.  He will continue to follow all MD/Medication recommendations.  He will attend AA on a weekly basis and continue to look for a sponsor.  He will journal thoughts and questions/feelings regarding material in Breathing Underwater, Spirituality and Recovery for next session.      Discharge Criteria/Planning: Patient will continue with follow-up until therapy can be discontinued without return of signs and symptoms.    Nelia Faustin 4/3/2017      This note was created with help of Dragon dictation software. Grammatical / typing errors are not intentional and inherent to the software.

## 2021-06-10 NOTE — PROGRESS NOTES
Mental Health Visit Note    4/17/2017    Start time: 0700    Stop Time: 0752   Session # 6    Primitivo Melchor is a 42 y.o. male is being seen today for    Chief Complaint   Patient presents with     Anxiety     Depression     Alcohol Problem     Follow-up   .     New symptoms or complaints: Patient comes to therapy today to continue to address ongoing issues associated with generalized anxiety disorder with features of depression.  Patient also discussed ongoing issues with his difficulties with alcohol and alcoholism.  Patient indicates that he continues to attend his AA meeting but has not been speaking directly and discussed the impact of speaking directly within that group and what that would mean for him.  Patient also discussed work-related issues and his progress with setting clear boundaries within the workplace.  Patient's thoughts feelings and responses were processed throughout the session.    Functional Impairment:   Personal: 2  Family: 3  Work: 2  Social:2    Clinical assessment of mental status: Patient is alert and oriented ×3 with no evidence of impairment in orientation memory or judgment.  Fund of knowledge is adequate.  Mood was stable.  He was thoughtful and cooperative throughout the session maintaining positive eye contact.  Affect was congruent with the content of speech is able to process his feelings and responses in an appropriate manner.  Thought content was absent hallucinations delusions and psychosis his insight was intact.  Thought process, concentration, speech and language, attention, and motor activity were all within normal limits.  He is well groomed his attire is appropriate and he appears his stated age.    Suicidal/Homicidal Ideation present: None Reported This Session    Patient's impression of their current status: Patient states I feel better than when I came in this morning I continue to see the ways in which my drinking is only one piece of the puzzle and that it has  really been a coping strategy and I know we have talked about this for a long time but I feel like it is starting to make sense to me.    Therapist impression of patients current state: This patient continues to make good progress he maintains his position of employment and continues to be functional while looking at his maladaptive coping strategies.  He continues to decline addressing his drinking through traditional treatment modalities and prefers instead to continue to work with his men's group his AA program and individual therapy to address underlying issues.  He continues to follow through with assignments and works at strengthening his ability to communicate directly about his own thoughts and feelings.    Type of psychotherapeutic technique provided: Insight oriented, Client centered, Solution-focused and CBT    Progress toward short term goals:Progress as expected, CBT approaches appear to be making a difference for him and he continues to work with identifying underlying beliefs about self and others.    Review of long term goals: Not done at today's visit and Date of last review 2/13/2017    Diagnosis:   1. Major depressive disorder, recurrent episode, moderate with anxious distress    2. Alcohol abuse        Plan and Follow up: Patient is scheduled for therapy every other week he continues to attend AA on a weekly basis and continues to keep a journal monitoring his thoughts feelings and observations for discussion next session.  He continues to follow all MD medication recommendations as prescribed      Discharge Criteria/Planning: Patient will continue with follow-up until therapy can be discontinued without return of signs and symptoms.    Nelia Faustin 4/17/2017      This note was created with help of Dragon dictation software. Grammatical / typing errors are not intentional and inherent to the software.

## 2021-06-10 NOTE — PROGRESS NOTES
Mental Health Visit Note    5/1/2017    Start time: 0700    Stop Time: 0752   Session # 7    Primitivo Melchor is a 42 y.o. male is being seen today for    Chief Complaint   Patient presents with     Depression     Anxiety     Alcohol Problem     Follow-up   .     New symptoms or complaints: Patient comes to therapy today to continue to address ongoing issues associated with major depressive disorder with anxious features and a problem with alcohol.  At today's session patient discussed increasing awareness of his need to quit drinking and the ways in which she has been struggling with making changes in his life and how this is impacted by his drinking.  Patient indicates that while his drinking has been decreasing over an extended period of time he can see that it has a negative impact for him.  Patient also talked about relationship difficulties and difficulties in the workplace.  Patient's thoughts feelings and responses and coping strategies as well as beliefs about self were explored throughout the session    Functional Impairment:   Personal: 2  Family: 1  Work: 2  Social:2    Clinical assessment of mental status: Patient is alert and oriented ×3 with no evidence of impairment in orientation memory or judgment.  Fund of knowledge is adequate.  Thought content is absent hallucinations delusions and psychosis.  Thought process, concentration, speech and language, attention, and motor activity are all within normal limits.  Affect is congruent with the content of speech mood was stable he was thoughtful and somewhat tearful during the session.  He is well groomed his attire is appropriate and he appears his stated age.  Maintains positive eye contact and is cooperative throughout the session    Suicidal/Homicidal Ideation present: None Reported This Session    Patient's impression of their current status: Patient states I feel better than when I walked in I have less anxiety when we talk about the relationship  that I have with myself things start to make more sense and I can see had been trying to find external changes to make me happy and that has not been working    Therapist impression of patients current state: This patient continues to make good progress he is able to function well in his professional life as well as his personal life and continues to explore areas for change and personal growth his drinking or lack thereof is part of this exploratory process.  He is making good progress.    Type of psychotherapeutic technique provided: Insight oriented, Client centered, Solution-focused and CBT    Progress toward short term goals:Progress as expected, Patient continues to address underlying issues associated with depression and anxiety that have to do with self image self confidence and self identification.    Review of long term goals: Not done at today's visit and Date of last review 2/13/2017    Diagnosis:   1. Major depressive disorder, recurrent episode, moderate with anxious distress    2. Alcohol abuse        Plan and Follow up: Patient is scheduled for therapy every 2 weeks.  He continues to attend his AA program and will continue to work with information in the big book as well as journal thoughts feelings and observations for discussion next session.      Discharge Criteria/Planning: Patient will continue with follow-up until therapy can be discontinued without return of signs and symptoms.    Nelia Faustin 5/1/2017      This note was created with help of Dragon dictation software. Grammatical / typing errors are not intentional and inherent to the software.

## 2021-06-11 NOTE — PROGRESS NOTES
Mental Health Visit Note    7/10/2017    Start time: 0715    Stop Time: 0805   Session # 11    Primitivo Melchor is a 42 y.o. male is being seen today for    Chief Complaint   Patient presents with     Depression     Anxiety     Alcohol Problem     Follow-up   .     New symptoms or complaints: Patient comes to therapy today to continue to address ongoing issues associated with major depressive disorder with anxious features.  He continues to struggle with alcohol abuse.  At today's session patient indicated that he was becoming tired with his day to day activities and choices that centered around drinking or lack of focused activities and he was ready to make some changes.  Patient processed his thoughts feelings and responses and observations.  He indicated that he had spent the weekend with his brother's friends who grew up in the same town they did and he wondered if he looked like them as they spent much of their time in the bars drinking and he just could not even keep up with them.  It was an eye opener he stated patient's thoughts feelings and observations around this particular issue explored throughout the session.    Functional Impairment:   Personal: 2  Family: 2  Work: 2  Social:2    Clinical assessment of mental status: Patient is alert and oriented ×3 with no evidence of impairment in orientation memory judgment.  Fund of knowledge is adequate.  Thought content is absent hallucinations delusions and psychosis.  Thought process, concentration, speech and language, attention, and motor activity are all within normal limits.  Affect is congruent with the content of speech and mood was stable.  He maintains positive eye contact and was cooperative throughout the session.  He is well-groomed his attire is appropriate and he appears his stated age.    Suicidal/Homicidal Ideation present: None Reported This Session    Patient's impression of their current status: Patient states I getting ready to make some  changes in a slow process coming to terms with some of the choices that I made and I have been disappointed in myself and I think I am finally ready to make the changes    Therapist impression of patients current state: This patient continues to move forward in his process the change has been slow for him but his insight is expanding and his decision making process around how he wishes to address his drinking is changing.  Therapy continues to focus around the choices he is making as well as providing supportive services to maintain and establish a solid recovery program.  At this time patient declines outpatient treatment and continues to struggle with reestablishing his participation in a.  Additionally patient employment opportunities and vision for himself was explored as well as coping strategies for managing anxiety.  Additionally he was encouraged to look at his relationship and his on feelings about being involved with someone who is  as this is a difficult relationship for him to end.    Type of psychotherapeutic technique provided: Insight oriented, Client centered, Solution-focused and CBT    Progress toward short term goals:Progress as expected, Patient continues to work towards making changes in his lifestyle    Review of long term goals: Not done at today's visit and Date of last review 2/13/2017    Diagnosis:   1. Major depressive disorder, recurrent episode, moderate with anxious distress    2. Alcohol abuse        Plan and Follow up: He will continue to follow all MD medication recommendations as prescribed.  Additionally patient will attend one AA meeting this week and report on progress next session.  Patient is scheduled for therapy on a weekly basis.      Discharge Criteria/Planning: Patient will continue with follow-up until therapy can be discontinued without return of signs and symptoms.    Nelia Faustin 7/10/2017      This note was created with help of Dragon dictation software.  Grammatical / typing errors are not intentional and inherent to the software.  Patient is scheduled for therapy on a weekly basis.

## 2021-06-11 NOTE — PROGRESS NOTES
Mental Health Visit Note    6/12/2017    Start time: 0700    Stop Time: 0752   Session # 9    Primitivo Melchor is a 42 y.o. male is being seen today for    Chief Complaint   Patient presents with     Depression     Anxiety     Follow-up   .     New symptoms or complaints: Patient comes to therapy today is follow-up for major depressive disorder with mixed features of anxiety.  At today's session patient continues to process the implications and impact of a significant relationship that he is making some choices about ending.  Additionally he also talked about his relationship with his father and his brother and thoughts and feelings that he has about his alcohol use.  Patient's thoughts feelings responses and coping strategies for managing depression and anxiety were explored throughout the session.    Functional Impairment:   Personal: 2  Family: 1  Work: 1  Social:2    Clinical assessment of mental status: Patient is alert and oriented ×3 with no evidence of impairment in orientation memory or judgment.  Fund of knowledge is adequate.  Thought content is absent hallucinations delusions and psychosis.  Process, concentration, speech and language, attention, and motor activity are all within normal limits.  Affect is congruent with the content of speech at times he was tearful he was able to regulate mood.  Mood is stable.  He maintains positive eye contact was cooperative and thoughtful throughout the session.  He is well-groomed his attire is appropriate and he appears his stated age.    Suicidal/Homicidal Ideation present: None Reported This Session    Patient's impression of their current status: I am really [place] last [place] when I went up I started reading the big book and I feel like being able to get away for 5 days on his fishing trip is helpful for me to continue to think about these choices that I am faced with I get greater clarity when I come in here and talk about  it but breaking up with someone and ending in a significant relationship never been easy for me and it causes me to have to look at how I feel about being alone    Therapist impression of patients current state: This patient continues to make good progress he is looking at underlying issues associated with his drinking as well as dependency issues whether there with the alcohol or relationships.  His ability to identify some of his underlying fears and anxieties and work with them in a different way is increasing.  At today's session he continued to improve skill building around breathing exercises and identifying coping strategies for addressing more directly his anxiety and depression.    Type of psychotherapeutic technique provided: Insight oriented, Client centered, Solution-focused and CBT    Progress toward short term goals:Progress as expected, Patient continues to improve alternative coping strategies for addressing issues associated with anxiety and depression    Review of long term goals: Not done at today's visit and Date of last review 2/13/2017    Diagnosis:   1. Major depressive disorder, recurrent episode, moderate with anxious distress    2. Alcohol abuse        Plan and Follow up: Patient is scheduled for therapy in 2 weeks as he will be out of town.  He will continue to work with information found in the big book as well as the book originals by Mehrdad Lomas for discussion next session.  He will attend his AA meeting and continue to work with the 12 steps.      Discharge Criteria/Planning: Patient will continue with follow-up until therapy can be discontinued without return of signs and symptoms.    Nelia Faustin 6/12/2017      This note was created with help of Dragon dictation software. Grammatical / typing errors are not intentional and inherent to the software.

## 2021-06-11 NOTE — PROGRESS NOTES
Mental Health Visit Note    6/5/2017    Start time: 0700    Stop Time: 0752   Session # 8    Primitivo Melchor is a 42 y.o. male is being seen today for    Chief Complaint   Patient presents with     Anxiety     Depression     Follow-up   .     New symptoms or complaints: Patient comes to therapy today to continue to address ongoing issues associated with major depressive disorder with moderate anxious distress.  At today's session he indicates that in the last month he stopped going to therapy and his AA meetings and has made the determination that this is not in his best interest and he needs to resume therapy and AA.  Patient talked about completing a camping trip that was difficult for him physically, work-related issues, relationship issues as well as exploring believes that he has about himself.  Patient's thoughts feelings and responses were explored throughout the session.    Functional Impairment:   Personal: 2  Family: 1  Work: 2  Social:1    Clinical assessment of mental status: Patient is alert and oriented ×3 with no evidence of impairment in orientation memory or judgment.  Fund of knowledge is adequate.  Thought content is absent hallucinations delusions and psychosis.  Thought process, concentration, speech and language, attention, and motor activity are all within normal limits.  Affect is congruent with the content of speech she was tearful during the session and mood was stable.  He maintains positive eye contact and was cooperative throughout the session.  He is well-groomed his attire is appropriate and he appears his stated age.    Suicidal/Homicidal Ideation present: None Reported This Session    Patient's impression of their current status: Patient states I feel significantly better than when I came in this morning.  I was nervous about coming in because I have been hearing a month and I felt like I had really made a mess of things is helpful to come in learning how to be less judgmental of  myself    Therapist impression of patients current state: This patient is continuing his process, he took a few weeks off of therapy and is coming to understand the need for working a regular recovery program for himself.  He was able to process some underlying beliefs and assumptions that he has about himself and to see that this is negative self talk and believes continue to sustain a dysfunctional lifestyle.    Type of psychotherapeutic technique provided: Insight oriented, Client centered, Solution-focused and CBT    Progress toward short term goals:Progress as expected, Patient's progress was slowed by his break from therapy he is resumed his therapy to continue to address underlying issues associated with his depression and anxiety.    Review of long term goals: Not done at today's visit and Date of last review 2/13/2017    Diagnosis:   1. Major depressive disorder, recurrent episode, moderate with anxious distress        Plan and Follow up: Patient is scheduled for therapy on a weekly basis.  He will resume his participation in the men's group and begin reading the big book again.  He will bring in thoughts questions and observations for discussion next week he continues to follow all MD medication recommendations as prescribed      Discharge Criteria/Planning: Patient will continue with follow-up until therapy can be discontinued without return of signs and symptoms.    Nelia Faustin 6/5/2017      This note was created with help of Dragon dictation software. Grammatical / typing errors are not intentional and inherent to the software.

## 2021-06-11 NOTE — PROGRESS NOTES
Mental Health Visit Note    6/26/2017    Start time: 0700    Stop Time: 0615   Session # 10    Primitivo Melchor is a 42 y.o. male is being seen today for    Chief Complaint   Patient presents with     Depression     Anxiety     Follow-up   .     New symptoms or complaints: Patient comes to therapy today to continue to address ongoing issues associated with major depressive disorder with anxious features and a problem.  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  Patient's thoughts feelings and responses around this trip his expectations for self and others as well as his relationship with his father and his family of origin was explored during session.    Functional Impairment:   Personal: 1  Family: 1  Work: 2  Social:1    Clinical assessment of mental status: Patient is alert and oriented ×3 with no evidence of impairment in orientation memory or judgment.  Fund of knowledge is adequate.  Mood is stable.  Affect is congruent with content of speech.  He was thoughtful and maintained positive eye contact and was cooperative throughout the session.  He is well-groomed his attire is appropriate and he appears his stated age.  Thought content is absent hallucinations delusions and psychosis.  Thought process, concentration, speech and language, attention, and motor activity are all within normal    Suicidal/Homicidal Ideation present: None Reported This Session    Patient's impression of their current status: Patient states I did not realize until today that I do not really have a lot of of forward looking visions for myself I do kind of wait until something happens and then I will decide like I think okay if they pass me over for a promotion  at work then I will  get a new job and not being very proactive this patient continues to address underlying passive that he.    Therapist impression of patients current state: As a result of early childhood events, he continues to struggle with being reactive rather than proactive and today's session was spent identifying what a vision for himself would look like or a plan for moving forward would look like in some concrete steps and he was also exploring the impact of his drinking and how much that zaps any creativity and strength from him.  He is moving forward and making progress and he has not yet made a decision about his drinking.  This continues to be addressed in a very direct way and patient continues to decline treatment options at this time.    Type of psychotherapeutic technique provided: Insight oriented, Client centered, Solution-focused and CBT    Progress toward short term goals:Progress as expected, Patient continues to struggle with his abuse of alcohol and continues to try to improve coping strategies that might replace his reliance on alcohol.    Review of long term goals: Not done at today's visit and Date of last review 2/13/2017    Diagnosis:   1. Major depressive disorder, recurrent episode, moderate with anxious distress    2. Alcohol abuse        Plan and Follow up: Patient comes to therapy every other week.  He continues to follow all MD medication recommendations as prescribed.  Additionally he is encouraged to resume attending AA on a weekly basis.  He will also begin reading [employer]  By AMINATA Lopez for discussion next session.  Patient was again asked to speak to his primary provider about his medications and use of alcohol for his own well-being and understanding of the limits and difficulties associated with taking those choices.  He declines treatment for alcoholism at this time      Discharge Criteria/Planning: Patient will continue with follow-up until therapy can be discontinued without return  of signs and symptoms.    Nelia Faustin 6/26/2017      This note was created with help of Dragon dictation software. Grammatical / typing errors are not intentional and inherent to the software.

## 2021-06-12 NOTE — PROGRESS NOTES
Mental Health Visit Note    9/11/2017    Start time: 0900    Stop Time: 1000   Session # 9    Primitivo Melchor is a 42 y.o. male is being seen today for    Chief Complaint   Patient presents with     Depression     Anxiety     Alcohol Problem     Follow-up   .     New symptoms or complaints: Patient comes to therapy today to address ongoing issues associated with major depressive disorder with anxious features.  Additionally he continues to struggle with alcohol abuse.  At today's session patient reports he has cut down his drinking and has resumed going to his AA group.  Patient wanted to talk about increased insight and understandings of his relationship issues and lifestyle choices that he has been making efforts he has been making him to turn his life in a different direction patient's thoughts feelings observations and responses were processed throughout the session.    Functional Impairment:   Personal: 1  Family: 0  Work: 2  Social:1    Clinical assessment of mental status: Patient is alert and oriented ×3 with no evidence of impairment in orientation memory or judgment.  Fund of knowledge is adequate.  Thought content is absent hallucinations delusions and psychosis.  Thought process, concentration, speech and language, attention, and motor activity are all within normal limits.  Affect is congruent with content of speech and mood is stable.  He is well-groomed his attire is appropriate and he appears his stated age.  Maintains positive eye contact and was cooperative throughout the session.    Suicidal/Homicidal Ideation present: None Reported This Session    Patient's impression of their current status: I feel like I am making progress I know that the drinking allows me to stay involved in a relationship that I should not be in and I drink because I am in a relationship I should not be in thoughts kind of a catch 22    Therapist impression of patients current state: This patient is gaining insight and  stability into the changes that he is trying to make.  His own integrity and observations as well as self-identity R strengthening good progress    Type of psychotherapeutic technique provided: Insight oriented, Client centered, Solution-focused and CBT    Progress toward short term goals:Progress as expected, Patient continues to utilize insights and attending his AA support group as a mechanisms for managing depression and anxiety    Review of long term goals: Not done at today's visit and Date of last review 7/30/2017    Diagnosis:   1. Major depressive disorder, recurrent episode, moderate with anxious distress    2. Alcohol abuse        Plan and Follow up: Patient is scheduled for therapy on every other week basis at this time due to travel schedules of the patient.  He will continue to attend his AA men's group and continue to work towards maintaining sobriety.  He brings in thoughts feelings observations and responses for discussion each session.      Discharge Criteria/Planning: Patient will continue with follow-up until therapy can be discontinued without return of signs and symptoms.    Nelia Faustin 9/11/2017      This note was created with help of Dragon dictation software. Grammatical / typing errors are not intentional and inherent to the software.

## 2021-06-12 NOTE — PROGRESS NOTES
Mental Health Visit Note    7/31/2017    Start time: 0700    Stop Time: 0800   Session # 12    Primitivo Melchor is a 42 y.o. male is being seen today for    Chief Complaint   Patient presents with     Depression     Anxiety     Alcohol Problem     Follow-up   .     New symptoms or complaints: Patient comes to therapy today to continue to address ongoing issues associated with major depressive disorder with anxious features.  At today's session patient's treatment plan was updated and testing measures were completed to determine levels of depression and anxiety.  Based on the information provided by the client he has seen a reduction in his overall symptoms of depression and anxiety.  He reports that his anxiety continues to be more difficult for him to dress then the depression however when speaking to the patient at today's sessions is clear that the depression is actually more of a contributing factor to the anxiety than he had anticipated.  Patient's thoughts feelings and responses were discussed throughout the session as well as his alcohol use.    Functional Impairment:   Personal: 1  Family: 1  Work: 2  Social:1    Clinical assessment of mental status: Patient is alert and oriented ×3 with no evidence of impairment in orientation memory or judgment.  Fund of knowledge is adequate.  Thought content is absent hallucinations delusions and psychosis.  Thought process, concentration, speech and language, attention, and motor activity are all within normal limits.  Affect is congruent with the content of speech and mood was stable.  He is well-groomed his attire is appropriate and he appears his stated age.  He maintained positive eye contact and was cooperative throughout the session.    Suicidal/Homicidal Ideation present: None Reported This Session    Patient's impression of their current status: I made a decision that I am going to abstain from alcohol for the next 3 weeks and I am going to be going to my AA  group again on Thursdays and probably try and add 1 for Saturday or Sunday as I feel like the weekends are more problematic for me.  What I know is that I am feeling kind of stuck and I need to make some changes now in my behavior not just in my thinking    Therapist impression of patients current state: This patient continues to take information from one session to the next and integrated into his thought process his position on his drinking coming to this determination on his own is an indication of insights gained during the therapeutic process and reflection of his continued willingness to change.  Efforts were made during today's session to come up with strategies that would help support this decision.    Type of psychotherapeutic technique provided: Insight oriented, Client centered, Solution-focused and CBT    Progress toward short term goals:Progress as expected, Patient reports a overall decrease in drinking and an increase in his efforts to clarify boundaries between self and others    Review of long term goals: Long-term goals reviewed 7/31/2017 and Treatment Plan updated    Diagnosis:   1. Major depressive disorder, recurrent episode, moderate with anxious distress    2. Alcohol abuse        Plan and Follow up: Every other week.  He will continue to attend AA on a weekly basis and continues to journal thoughts feelings and responses for discussion each session.      Discharge Criteria/Planning: Patient will continue with follow-up until therapy can be discontinued without return of signs and symptoms.    Nelia Faustin 7/31/2017      This note was created with help of Dragon dictation software. Grammatical / typing errors are not intentional and inherent to the software.

## 2021-06-12 NOTE — PROGRESS NOTES
Mental Health Visit Note    8/14/2017    Start time: 0700    Stop Time: 0800   Session # 14    Primitivo Melchor is a 42 y.o. male is being seen today for    Chief Complaint   Patient presents with     Depression     Anxiety     Alcohol Problem     Follow-up   .     New symptoms or complaints: Patient comes to therapy today to address issues associated with major depressive disorder with anxious features.  At today's session patient talked about his alcohol use acknowledging that he drank too much over the weekend.  Patient reviewed relationship status and his overall sense of unhappiness and on the ease with some of his life choices.  Patient talked about his chemical use as well as relationship difficulties and life choices that he had in front of him.  Patient indicated it was hard for him to look at his choices and behaviors because he was conflicted about what he needs and wants to do versus maintaining relationship that he knows is not good for him.  Patient's thoughts feelings and responses were discussed throughout the session.    Functional Impairment:   Personal: 3  Family: 1  Work: 2  Social:1    Clinical assessment of mental status: Patient is alert and oriented ×3 with no evidence of impairment in orientation memory judgment.  Fund of knowledge is adequate.  Mood is stable.  Thought content is absent hallucinations delusions and psychosis.  Thought process, concentration, speech and language, attention, and motor activity are all within normal limits.  Affect is congruent with the content of speech.  Eye contact is appropriate and he was cooperative throughout the session.  Affect is congruent with content of speech at times he was tearful.    Suicidal/Homicidal Ideation present: None Reported This Session    Patient's impression of their current status: I feel like I have made some progress think about a year ago I would have been in a deep depression about myself and my behaviors today I do not feel  very good and I know it is not a good choice for me but I am not engaging in negative cognitions very deep depression.  I know that I need to make some changes I am just not sure which changes him to make at this time I know that I am not happy    Therapist impression of patients current state: This patient is making some progress at today's session his alcohol use was spoken to and very direct fashion and he was asked to once again consider outpatient treatment for alcoholism.  Patient had had a chemical dependency evaluation about a year ago but declined treatment at that time.  Patient was informed that he could continue with individual therapy but that his drinking and chemical use needed to be addressed as it continued to be an impediment to his progress.  Patient will consider his options and this will be spoken to when he returns from his vacation.    Type of psychotherapeutic technique provided: Insight oriented, Client centered, Solution-focused and CBT    Progress toward short term goals:Progress as expected, Patient continues to consider treatment options as well as monitoring his alcohol use.    Review of long term goals: Not done at today's visit and Date of last review 7/30/2017    Diagnosis:   1. Major depressive disorder, recurrent episode, moderate with anxious distress    2. Alcohol abuse        Plan and Follow up: Patient is scheduled for therapy in 2 weeks as he is going out of town next week camping expedition with his friends.  Patient will continue to monitor his alcohol use and continue to read information in the big book.  Additionally he will consider entering an outpatient treatment program upon his return.  He continues to follow all MD medication recommendations as prescribed.      Discharge Criteria/Planning: Patient will continue with follow-up until therapy can be discontinued without return of signs and symptoms.    Nelia Faustin 8/14/2017      This note was created with help of  Dragon dictation software. Grammatical / typing errors are not intentional and inherent to the software.

## 2021-06-12 NOTE — PROGRESS NOTES
Outpatient Mental Health Treatment Plan    Name:  Primitivo Melchor  :  1975  MRN:  [id number]    Treatment Plan:  Updated Treatment Plan  Intake/initial treatment plan date:  2017  Benefit and risks and alternatives have been discussed: Yes  Is this treatment appropriate with minimal intrusion/restrictions: Yes  Estimated duration of treatment:  Approximately 12 sessions.  Anticipated frequency of services:  Every 2 weeks  Necessity for frequency: This frequency is needed to establish therapeutic goals and for continuity of care in order to monitor progress.  Necessity for treatment: To address cognitive, behavioral, and/or emotional barriers in order to work toward goals and to improve quality of life.    Plan:      ? Depression    Goal:  Decrease average depression level from 6 to 4.   Strategies:    ?[x] Decrease social isolation     [x] Increase involvement in meaningful activities     ?[x] Discuss sleep hygiene     ?[x] Explore thoughts and expectations about self and others     ?[x] Process grief (loss of significant person, independence, role,        etc.)     ?[x] Assess for suicide risk     ?[x] Implement physical activity routine (with physician approval)     [x] Consider introduction of bibliotherapy and/or videos     [x] Continue compliance with medical treatment plan (or explore         barriers)       ?    ?Degree to which this is a problem: 2  Degree to which goal is met: 1  Date of Review: 2017     ? Depression    Goal:  Decrease average depression level from 5 to 2.   Strategies:    ?[x] Decrease social isolation     [x] Increase involvement in meaningful activities     ?[x] Discuss sleep hygiene     ?[x] Explore thoughts and expectations about self and others     ?[x] Process grief (loss of significant person, independence, role,        etc.)     ?[x] Assess for suicide risk     ?[x] Implement physical activity routine (with physician approval)     [x] Consider introduction of  bibliotherapy and/or videos     [x] Continue compliance with medical treatment plan (or explore         barriers)       ?    Degree to which this is a problem: 1  Degree to which goal is met: 1  Date of Review: 12/31/2017    Substance use  Goal:  To promote a solid recovery program with abstinance.   Strategies: ? [x] Consider referral for chemical dependency evaluation     ? [x] Discuss barriers to participating in AA or other peer-facilitated           groups         [x] Address environmental factors which may interfere with                                                    sobriety     ? [x] Explore short-term versus long-term consequences of use     ? [x] Continue compliance with medical treatment plan (or explore          barriers)         ?    Degree to which this is a problem: 2  Degree to which goal is met: 0  Date of Review: 12/30/2017       Functional Impairment:  1=Not at all/Rarely  2=Some days  3=Most Days  4=Every Day    Personal : 1  Family : 1  Social : 1   Work/school : 2    Diagnosis:   Major depressive disorder, recurrent, moderate with anxious features, Problems with Alcohol, relationship issues    WHODAS 2.0 12-item version 6.25%      Scores presented in qualifiers to represent level of disability.  MILD Problem (slight, low, ...) 5-24%    H1= 30  H2= 0  H3= 0        Clinical assessments and measures completed:. CHASE-7, PHQ-9 and CAGE-AID     Strengths:  Willingness to change, commitment to process, family support, intelligence, humor, insight, compassion, kindness, authenticity  Limitations:  lack of self trust, self acceptance,   Cultural Considerations: family history of emotional abuse and abandonment issues    Persons responsible for this plan: Patient and Provider            Psychotherapist Signature           Patient Signature:              Guardian Signature             Provider: Performed and documented by Nelia Faustin [facility]   Date:  7/30/2017      This note was created with help of  Dragon dictation software.  Grammatical / typing errors are not intentional and inherent to the software.

## 2021-06-12 NOTE — PROGRESS NOTES
Mental Health Visit Note    8/7/2017    Start time: 0700    Stop Time: 0800   Session # 13    Primitivo Melchor is a 42 y.o. male is being seen today for    Chief Complaint   Patient presents with     Depression     Anxiety     Alcohol Problem     Follow-up   .     New symptoms or complaints: Patient comes to therapy today to continue to address ongoing issues associated with major depressive disorder with anxious features and continued problems with alcohol abuse.  At today's session patient reports he had a good week of abstinence and efforts towards working his recovery program then he had a social encounter over the weekend where he met up with some people from his past and he drank both to calm his anxiety and because it was the social setting.  Patient talked about feeling bad about his drinking as well and has some of the thoughts and feelings he had with really encountering people from his past.    Functional Impairment:   Personal: 1  Family: 0  Work: 2  Social:2    Clinical assessment of mental status: Patient is alert and oriented ×3 with no evidence of impairment in orientation memory or judgment.  Fund of knowledge is adequate.  Mood is stable.  He maintains positive eye contact and was cooperative throughout the session.  He is well-groomed his attire is appropriate and he appears his stated age.  Thought content is absent hallucinations delusions and psychosis.  Thought process, concentration, speech and language, attention, and motor activity are all within normal limits.  Affect is congruent with the content of speech.    Suicidal/Homicidal Ideation present: None Reported This Session    Patient's impression of their current status: Patient states I know that I am making progress is disappointed in myself that I was drinking because I am trying not to that I know that I need to increase going to AA.  I do not want to go to treatment right now feel like I am ready for that I want to keep trying to  sort this out I was thinking when I drove in that I been making some changes and progress is slow but I will consider going in for an evaluation    Therapist impression of patients current state: This patient continually struggles with his alcohol use and the underlying lack of self-confidence and self acceptance.  Patient continues to sabotage his efforts at work and in social relationships and sees that he is engaging in self sabotaging behaviors not enough to disrupt his employment or his social life but enough to keep him from feeling fulfilled or happy.  Patient continues to struggle with level of passive that he is well as fear of commitment and responsibility.  Both were explored throughout the session as well as his alcohol abuse.    Type of psychotherapeutic technique provided: Insight oriented, Client centered, Solution-focused and CBT    Progress toward short term goals:Progress as expected, Patient continues to make some progress but his progress is impeded by his use of alcohol at this time he is not willing to enter any kind of a treatment program.  He does continue to maintain his employment and long-term relationships.    Review of long term goals: Not done at today's visit and Date of last review 7/30/2017    Diagnosis:   1. Major depressive disorder, recurrent episode, moderate with anxious distress    2. Alcohol abuse        Plan and Follow up: Pt is scheduled for therapy next week, he continues to follow all MD recommendations for medications, he continues to attend AA and is striving to establish a recovery program.  He continues to decline treatment as an option at this time.      Discharge Criteria/Planning: Patient will continue with follow-up until therapy can be discontinued without return of signs and symptoms.    Nelia Faustin 8/7/2017      This note was created with help of Dragon dictation software. Grammatical / typing errors are not intentional and inherent to the software.

## 2021-06-12 NOTE — PROGRESS NOTES
Mental Health Visit Note    8/28/2017    Start time: 0700    Stop Time: 008   Session # 15    Primitivo Melchor is a 42 y.o. male is being seen today for    Chief Complaint   Patient presents with     Anxiety     Depression     Alcohol Problem     Follow-up   .     New symptoms or complaints: Patient comes to therapy today to continue to address ongoing issues associated with major depressive disorder with anxious features as well as support for addressing chronic alcohol problem.  At today's session patient talked about his trip to [place], leaving the clips, the impact of being in nature, and a growing realization that his behavior and life choices are causing him to have a life that is too small for himself.  Patient also indicates that there is been a major shift in his relationship and that he feels that both he and the woman he has been seeing are coming to the realization that the relationship will go no further than it is and he is not healthy for either 1 of them.  Patient's thoughts feelings responses insights and grief were processed throughout the session.    Functional Impairment:   Personal: 2  Family: 0  Work: 1  Social:1    Clinical assessment of mental status: Patient is alert and oriented ×3 with no evidence of impairment in orientation memory or judgment.  Fund of knowledge is adequate.  Mood is stable.  Thought content is absent hallucinations delusions and psychosis.  Thought process, concentration, speech and language, attention, and motor activity are all within normal limits.  Affect is congruent with content of speech at times he was tearful.  He is well-groomed his attire is appropriate and he appears his stated age.  He maintains positive eye contact and was cooperative throughout the session.    Suicidal/Homicidal Ideation present: None Reported This Session    Patient's impression of their current status: Patient states I feel like I am making progress the trip was very good for me and to  return home like I could take a look at my life and some of my life choices and see that I can have other choices.  I was in the shower this morning I started going down the road of self judgment and self-condemnation but I stopped myself and that is improvement to interrupt that negative self talk    Therapist impression of patients current state: This patient continues to make progress he directly discussed the impact of his life choices as well as maladaptive coping strategies such as the use of alcohol as well as some of the denial that he uses.  He is continuing to make changes in his life by behavior rather than declaration and his progress is in forward motion at this time.  His use of alcohol as a coping strategy was also addressed directly in session patient at this time again declines entering a treatment program but has indicated he would return to his men's group.    Type of psychotherapeutic technique provided: Insight oriented, Client centered, Solution-focused and CBT    Progress toward short term goals:Progress as expected, Patient continues to evaluate coping strategies and life choices that are underlying his depression and anxiety.    Review of long term goals: Not done at today's visit and Date of last review 7/30/2017    Diagnosis:   1. Major depressive disorder, recurrent episode, moderate with anxious distress        Plan and Follow up: Patient is scheduled for therapy in 2 weeks.  He will attend his AA men's group and begin reading the big book for discussion both in his AA group and in session.  Patient continues to journal thoughts feelings and observations for discussion each week.      Discharge Criteria/Planning: Patient will continue with follow-up until therapy can be discontinued without return of signs and symptoms.    Nelia Faustin 8/28/2017      This note was created with help of Dragon dictation software. Grammatical / typing errors are not intentional and inherent to the  software.

## 2021-06-13 NOTE — PROGRESS NOTES
Mental Health Visit Note    10/2/2017    Start time: 0700    Stop Time: 0800   Session # 17    Primitivo Melchor is a 42 y.o. male is being seen today for    Chief Complaint   Patient presents with     Anxiety     Depression     Follow-up   .     New symptoms or complaints: Patient comes to therapy today to address ongoing issues associated with major depressive disorder with anxious features.  At today's session patient informs his mother's been in the hospital for 4 days due to medication and neurologic issues as well as impairment in the ability to take care of herself.  Patient spent the session talking about the events that led up to her hospitalization and his frustration with trying to sort out the mass that has been created and his sense of responsibility and his aversion to wanting to be responsible.  Patient's anxiety and depressive levels were increased due to his sense of feeling overwhelmed by the crisis situation.  Patient's thoughts feelings responses were processed throughout the session.    Functional Impairment:   Personal: 1  Family: 3  Work: 1  Social:1    Clinical assessment of mental status: Patient is alert and oriented ×3 with no evidence of impairment in orientation memory or judgment.  Fund of knowledge is adequate.  Thought content is absent hallucinations delusions and psychosis.  Thought process, concentration, speech and language, attention, and motor activity are all within normal limits.  Affect is congruent with the content of speech.  He maintains positive eye contact and was cooperative throughout the session.  He is well-groomed his attire is appropriate and he appears his stated age.  Mood is stable.    Suicidal/Homicidal Ideation present: None Reported This Session    Patient's impression of their current status: Patient states I feel better having been able to telemetry I do not want to have to take care of any of this and know that I am going to leave it all to my sister is  helpful for me to get clear about what I can do the 3 areas that I can focus on and ultimately what is going to happen with her I am unclear I just do not want to have to be financially responsible for her that scares me    Therapist impression of patients current state: This patient was able to process his emotional responses to the current family crisis due to his mother's decline in health.  Patient was also able to get clear about the tasks that he could accomplish and how to more effectively communicate with his siblings patient's beliefs and assumptions about what he had to be responsible for versus what might actually be a contribution he can make with the helpful framing for him.    Type of psychotherapeutic technique provided: Insight oriented, Client centered, Solution-focused and CBT    Progress toward short term goals:Progress as expected, Patient continues to utilize CBT and exploring believes about self and others as well as assumptions and utilizing tools to help identify concrete actions that he can take is making good progress    Review of long term goals: Not done at today's visit and Date of last review 7/30/2017    Diagnosis:   1. Major depressive disorder, recurrent episode, moderate with anxious distress        Plan and Follow up: Patient is scheduled for therapy on a weekly basis.  He will continue to follow all MD medication recommendations as prescribed.  Patient did not discuss his drinking at today's session he continues to read in the big book and declines intervention any other method at this time he indicates he is returning to his men's group.      Discharge Criteria/Planning: Patient will continue with follow-up until therapy can be discontinued without return of signs and symptoms.    Nelia Faustin 10/2/2017      This note was created with help of Dragon dictation software. Grammatical / typing errors are not intentional and inherent to the software.

## 2021-06-13 NOTE — PROGRESS NOTES
Mental Health Visit Note    11/6/2017    Start time: 0815    Stop Time: 0900   Session # 20    Primitivo Melchor is a 42 y.o. male is being seen today for    Chief Complaint   Patient presents with     Depression     Anxiety     Alcohol Problem     Follow-up   .     New symptoms or complaints: Patient comes to therapy today as follow-up for anxious and distressed with mixed features.  At today's session patient reported that he is becoming increasingly agitated with some of her life choices he is making and feels he wants to make some changes.  Patient reviewed relationship issues and work-related issues.  Patient's thoughts feelings and responses are process as well as his underlying anxiety relative to his agitation and depressive symptoms relative to past 70 and difficulty with instituting change in his life.    Functional Impairment:   Personal: 2  Family: 1  Work: 2  Social:2    Clinical assessment of mental status: Patient is alert and oriented ×3 with no evidence of impairment in orientation memory or judgment.  Fund of knowledge is adequate.  Mood is stable.  Thought content is absent hallucinations delusions and psychosis.  Thought process, concentration, speech and language, attention, and motor activity are all within normal limits.  Affect is congruent with content of speech.  He is well-groomed his attire is appropriate and he appears his stated age.  He maintains positive eye contact and was cooperative throughout the session.    Suicidal/Homicidal Ideation present: None Reported This Session    Patient's impression of their current status: Patient states mostly I am mad at myself I would like to make it about my girlfriend but the fact is it is really not about her I need to make some changes and is hard for me to do that I worry I am going to be alone I worry that I am not can never find anybody loves me and instead of dealing with addressing issues of loneliness I run and I get tired  that    Therapist impression of patients current state: This patient continues to make slow and steady progress his insight is growing and intact and he is speaking more directly to his loneliness and the ways he tries to cope with that using self sabotaging behaviors rather than engaging in behaviors that are self-sustaining or self acknowledging.  Today's session was spent talking about the difficulties with self acceptance and how to address issues of self-loathing and fear.  He is making good progress.    Type of psychotherapeutic technique provided: Insight oriented, Client centered, Solution-focused and CBT    Progress toward short term goals:Progress as expected, Patient continues to work at addressing underlying anxiety and fear.    Review of long term goals: Not done at today's visit and Date of last review 7/30/2017    Diagnosis:   1. Major depressive disorder, recurrent episode, moderate with anxious distress    2. Alcohol abuse        Plan and Follow up: Patient is scheduled for therapy 1-2 weeks depending on conflicts with his work schedule.  He continues to follow MD medication recommendations as prescribed.  Additionally patient will attend his men's group and begin reading the big book starting the chapter 1 for discussion next session.      Discharge Criteria/Planning: Patient will continue with follow-up until therapy can be discontinued without return of signs and symptoms.    Nelia Faustin 11/6/2017      This note was created with help of Dragon dictation software. Grammatical / typing errors are not intentional and inherent to the software.

## 2021-06-13 NOTE — PROGRESS NOTES
Mental Health Visit Note    10/30/2017    Start time: 0700    Stop Time: 0800   Session # 19    Primitivo Melchor is a 42 y.o. male is being seen today for    Chief Complaint   Patient presents with     Anxiety     Depression     Follow-up   .     New symptoms or complaints: Patient comes to therapy today to address ongoing issues associated with major depressive disorder with anxious features.  At today's session patient reports ongoing communication and insights with his mother's recovery.  Patient also indicates ongoing difficulties with his primary relationship as he sees and understands that it is completing.  Patient indicated that he struggled with self-medicating through the breakup and wanted to use therapy as processing his feelings thoughts and beliefs about those primary relationships.    Functional Impairment:   Personal: 2  Family: 2  Work: 2  Social:1    Clinical assessment of mental status: Patient is alert and oriented ×3 with no evidence of impairment in orientation memory judgment.  Fund of knowledge is adequate.  Thought content is absent hallucinations delusions and psychosis.  Thought process, concentration, speech and language, attention, and motor activity are all within normal limits.  Affect is congruent with content of speech.  He maintains positive eye contact and was cooperative throughout the session.  He is well-groomed his attire is appropriate and he appears his stated age.  It should be noted patient appeared tired and in some distress given the changes in his personal relationships.    Suicidal/Homicidal Ideation present: None present at session    Patient's impression of their current status: Patient states I want to be able to deal with my emotions around this breakup instead of avoiding them.  I am aware the cost is too high now but I am afraid and mostly when I am discovering is that I am afraid to be alone    Therapist impression of patients current state: This patient is  gaining insight into his difficulties with being comfortable in his own sense of self and the length he will go to to avoid his feelings is becoming clearer to him as well as codependent patterns.  He is also able to look at his maladaptive coping strategies and the amount of self medication involved in his alcohol use.  Patient is still not willing to enter a treatment program but is more willing to consider returning to his AA group.    Type of psychotherapeutic technique provided: Insight oriented, Client centered, Solution-focused and CBT    Progress toward short term goals:Progress as expected, Patient continues to struggle with self denial and repression of feelings but is making some progress relative to insight and changing some of his beliefs about himself as regards to responsibility for other people.    Review of long term goals: Not done at today's visit and Date of last review 7/30/2017    Diagnosis:   1. Major depressive disorder, recurrent episode, moderate with anxious distress    2. Alcohol abuse        Plan and Follow up: Patient is scheduled for therapy next week due to patient's request for additional support while he navigates the breakup of a significant relationship.  He continues to follow all MD medication recommendations as prescribed and patient was asked to consider attending AA twice this week.      Discharge Criteria/Planning: Patient will continue with follow-up until therapy can be discontinued without return of signs and symptoms.    Nelia Faustin 10/30/2017      This note was created with help of Dragon dictation software. Grammatical / typing errors are not intentional and inherent to the software.

## 2021-06-14 NOTE — PROGRESS NOTES
Mental Health Visit Note    11/20/2017    Start time: 0700    Stop Time: 0800   Session # 21    Primitivo Melchor is a 42 y.o. male is being seen today for    Chief Complaint   Patient presents with     Depression     Anxiety     Alcohol Problem     Follow-up   .     New symptoms or complaints: That he is returning to his AA group and that he has made some decisions about his wife and life choices.  Patient states I am tired of feeling like I am not really accomplishing things and making some changes at work in my schedule and he feels been putting some structure in place that will help me make these changes.  Patient discussed relationship issues work issues and the resultant anxiety and depression associated with making changes.  Patient comes to therapy today to address ongoing issues associated with major depressive disorder with anxious features as well as address an alcohol problem.  At today's session patient reports    Functional Impairment:   Personal: 2  Family: 1  Work: 2  Social:2    Clinical assessment of mental status: Patient is alert and oriented ×3 with no evidence of impairment in orientation memory or judgment.  Fund of knowledge is adequate.  He is well-groomed his attire is appropriate and he appears his stated age.  He was cooperative and maintained positive eye contact throughout the session.  Mood is stable at times he was tearful but able to regulate his emotion affect and mood.  Thought content is absent hallucinations delusions and psychosis.  Thought process, concentration, speech and language, attention, and motor activity are all within normal limits.    Suicidal/Homicidal Ideation present: None Reported This Session    Patient's impression of their current status: I have been feeling like I am tired of wasting time and I am getting agitated I wanted to make some changes and I know that I need to stop drinking if him can make changes.  I want to go to treatment but I am willing to go  back to my AA group I can see that it really prevents me from continuing to move forward in the way that I want to end my life and getting ready to deal with participating in AA.    Therapist impression of patients current state: This patient is making steady progress although slow he continues to gather the information and data he needs in order to make changes.  He maintains his employment and relationships with others and has struggled with coming to terms with his alcoholism as a major impediment to creating the changes he needs.  Patient does acknowledge that he has made much progress in terms of his overall sense of well-being and a decrease in generalized anxiety.  He indicates that his anxiety increases when he drinks and he is starting to understand that is a major problem for him.  He is making good progress.    Type of psychotherapeutic technique provided: Insight oriented, Client centered, Solution-focused and CBT    Progress toward short term goals:Progress as expected, Patient continues to increase his commitment to change and continues to be responsive to CBT interventions.  Patient states    Review of long term goals: Not done at today's visit and Date of last review 7/30/2017    Diagnosis:   1. Major depressive disorder, recurrent episode, moderate with anxious distress    2. Alcohol abuse        Plan and Follow up: Patient is scheduled for therapy and weekly basis.  He will attend his AA meeting on Thursdays and will meet with his  at his gym to set up a 60 day challenge.  Patient will continue to work with the 12-step program and journal thoughts feelings and observations for discussion next week.  He continues to follow all MD medication recommendations as prescribed.      Discharge Criteria/Planning: Patient will continue with follow-up until therapy can be discontinued without return of signs and symptoms.    Nelia Faustin 11/20/2017      This note was created with help of Dragon  dictation software. Grammatical / typing errors are not intentional and inherent to the software.

## 2021-06-14 NOTE — PROGRESS NOTES
Outpatient Mental Health Treatment Plan    Name:  Primitivo Melchor  :  1975  MRN:  [id number]    Treatment Plan:  Updated Treatment Plan  Intake/initial treatment plan date:  2017  Benefit and risks and alternatives have been discussed: Yes  Is this treatment appropriate with minimal intrusion/restrictions: Yes  Estimated duration of treatment:  Approximately 12 sessions.  Anticipated frequency of services:  Every 2 weeks  Necessity for frequency: This frequency is needed to establish therapeutic goals and for continuity of care in order to monitor progress.  Necessity for treatment: To address cognitive, behavioral, and/or emotional barriers in order to work toward goals and to improve quality of life.    Plan:           ?   ? Anxiety    Goal:  Decrease average anxiety level from 6 to 3.   Strategies: ? [x]Learn and practice relaxation techniques and other coping        strategies (e.g., thought stopping, reframing, meditation)     ? [x] Increase involvement in meaningful activities     ? [x] Discuss sleep hygiene     ? [x] Explore thoughts and expectations about self and others     ? [x] Identify and monitor triggers for panic/anxiety symptoms     ? [x] Implement physical activity routine (with physician approval)     ? [x] Consider introduction of bibliotherapy and/or videos     ? [x] Continue compliance with medical treatment plan (or explore          barriers)                                       [x]       ?Degree to which this is a problem: 2  Degree to which goal is met: 1  Date of Review:      ? Depression    Goal:  Decrease average depression level from 7 to 4.   Strategies:    ?[x] Decrease social isolation     [x] Increase involvement in meaningful activities     ?[x] Discuss sleep hygiene     ?[x] Explore thoughts and expectations about self and others     ?[x] Process grief (loss of significant person, independence, role,        etc.)     ?[x] Assess for suicide risk     ?[x]  Implement physical activity routine (with physician approval)     [x] Consider introduction of bibliotherapy and/or videos     [x] Continue compliance with medical treatment plan (or explore         barriers)       ?    Degree to which this is a problem: 2  Degree to which goal is met: 2  Date of Review: 3/18/2018  Substance use  Goal:  To maintain sobriety.   Strategies: ? [x] Consider referral for chemical dependency evaluation     ? [x] Discuss barriers to participating in AA or other peer-facilitated           groups         [x] Address environmental factors which may interfere with                                                    sobriety     ? [x] Explore short-term versus long-term consequences of use     ? [x] Continue compliance with medical treatment plan (or explore          barriers)         ?    Degree to which this is a problem: 4  Degree to which goal is met: 0  Date of Review: 3/18/2018       Functional Impairment:  1=Not at all/Rarely  2=Some days  3=Most Days  4=Every Day    Personal : 3  Family : 2  Social : 2   Work/school : 2    Diagnosis:   Major depressive disorder, recurrent, mild; Generalized Anxiety disorder; Substance Abuse      WHODAS 2.0 12-item version 10.42%     Scores presented in qualifiers to represent level of disability.  MILD Problem (slight, low, ...) 5-24%    H1= 10  H2= 0  H3= 3        Clinical assessments and measures completed:. CHASE-7, PHQ-9, CAGE-AID and PANSI     Strengths:  Good communication skills, Listening, compassion, humor, commitment to change, intelligence, family support, loyal, honest, integrity,   Limitations:  Self doubt, self criticism,   Cultural Considerations: family of origin abandonment and emotional neglect issues, alcoholism active    Persons responsible for this plan: Patient and Provider            Psychotherapist Signature           Patient Signature:              Guardian Signature             Provider: Performed and documented by Nelia Faustin  [facility]   Date:  12/18/2017      This note was created with help of Dragon dictation software.  Grammatical / typing errors are not intentional and inherent to the software.

## 2021-06-14 NOTE — PROGRESS NOTES
Mental Health Visit Note    12/18/2017    Start time: 0700    Stop Time: 0800   Session # 23    Primitivo Melchor is a 42 y.o. male is being seen today for    Chief Complaint   Patient presents with     Depression     Anxiety     Alcohol Problem     Follow-up   .     New symptoms or complaints: Patient comes to therapy today to address ongoing issues associated with major depressive disorder recurrent mild with anxious features.  Additionally continues to address alcohol abuse issues.  At today's session patient's treatment plan was updated and the Pansy was administered as patient indicated he had experienced thoughts of suicide.  Patient's suicidal ideations were explored at length during the treatment plan update to determine the frequency and intensity and depth of planning.  Patient indicates that the suicide thoughts and ideations, and relative to the breakup of a primary relationship and his he did not feel that he had any serious plans.  The Pansy was administered and the results were reviewed with the patient indicating that the suicide ideation was something that he should take seriously a safety plan was completed as patient did not feel he was a danger to himself.  He was given the number for the [facility] urgent care at [phone] and directed to also go to the emergency room or talk with his brother whom he lives with should he feel unsafe.  Patient also was advised to contact his primary provider or to contact this clinic for follow-up again should he feel unsafe.  The remainder of the session was spent processing patient's acknowledgment of his alcoholism and the contributing impact this has on his sense of hopelessness.  Patient has an AA group that he has attended before and resources for chemical dependency evaluation as well as returning to his AA group were discussed at length in session.  Patient also indicated that with the breakup of this primary relationship he has seen  improvement in his work as well as movement in the overall quality of relationships at work so he presented information that said it is not all oblique.  He acknowledged the difficulty that he has with interpersonal relationships and fear of being alone.  This was explored at length in session.  Patient's thoughts feelings and responses were reviewed throughout the session.  Patient was stable when session ended.    Functional Impairment:   Personal: 3  Family: 2  Work: 2  Social:2    Clinical assessment of mental status: Patient is alert and oriented ×3 with no evidence of impairment in orientation memory or judgment.  Fund of knowledge is adequate.  Thought content is absent hallucinations delusions and psychosis.  Thought process, concentration, speech and language, attention, and motor activity are all within normal limits.  Affect is congruent with content of speech and mood was stable.  He maintains positive eye contact and was cooperative throughout the session.  He is well-groomed his attire is appropriate and he appears his stated age.    Suicidal/Homicidal Ideation present: Yes: pt reported thoughts of suicide with no plans or access to means.  Pansi completed and safety plan reviewed.  Please see above note in problems presented at session for complete safety plan.    Patient's impression of their current status: I feel like I am making progress the difficulty that I have with stopping drinking is becoming something that I cannot avoid looking at.  I am just more and more aware that that is a major block to my progress and I wanted to talk about that.  I think breaking this relationship off is the best thing for me but it has triggered all the things that I been afraid of like missing out on something or being alone and coming in here was helpful today to address process some that instead of the kind of distorted thought process I get into when I am by myself.    Therapist impression of patients current  state: This patient continues to make progress first half of the session was difficult as he struggled with a sense of passivity and ejection as he deals with the ending of a significant relationship.  This relationship has been the focus of much of his therapeutic endeavors and he struggled to allow himself to access his grief.  Once we were able to really talk about that relationship and the changes that he has already made patient can see that much of the difficulty is behind him and what he is looking at now is a fear of being alone and his own sense of disconnect from himself.  He made good progress on this issue at close of session.    Type of psychotherapeutic technique provided: Insight oriented, Client centered, Solution-focused and CBT    Progress toward short term goals:Progress as expected, Patient is much more aware of the alcohol abuse and today's session was the first time that he labeled himself alcoholic he is making progress with his willingness to seek help around this issue.    Review of long term goals: Long-term goals reviewed 12/18/2017 and Treatment Plan updated    Diagnosis:   1. Major depressive disorder, recurrent episode, moderate with anxious distress    2. Alcohol abuse        Plan and Follow up: Patient is scheduled for therapy and 2-3 weeks due to conflicts with this provider scheduled.  He will return to his AA group and continue to read the big book as well as breathing underwater 12 step recovery program for discussion next session.  Patient also is aware that should he feel that he is a danger to himself or the ideation is too much for him that he will report to the nearest emergency room talk with his brother and sister or seek urgent care at [facility] urgent care.      Discharge Criteria/Planning: Patient will continue with follow-up until therapy can be discontinued without return of signs and symptoms.    Nelia Faustin 12/18/2017      This note was created  with help of Dragon dictation software. Grammatical / typing errors are not intentional and inherent to the software.

## 2021-06-14 NOTE — PROGRESS NOTES
Mental Health Visit Note    12/4/2017    Start time: 0700    Stop Time: 0800   Session # 22    Primitivo Melchor is a 42 y.o. male is being seen today for    Chief Complaint   Patient presents with     Depression     Anxiety     Alcohol Problem     Follow-up   .     New symptoms or complaints: Patient comes to therapy today to address ongoing issues associated with major depressive disorder recurrent moderate with mixed features of anxiety.  Additionally patient continues to address ongoing issues with alcohol abuse.  At today's session patient indicates that he continues to try to and a significant relationship and process the resultant feelings of anxiety sadness grief remorse and regret etc.  Patient's coping strategies and beliefs about self responsibility and others were processed at length in session.    Functional Impairment:   Personal: 2  Family: 1  Work: 2  Social:2    Clinical assessment of mental status: Patient is alert and oriented ×3 with no evidence of impairment in orientation memory or judgment.  Fund of knowledge is adequate.  Thought content is absent hallucinations delusions and psychosis.  Thought process, concentration, speech and language, attention, and motor activity are all within normal limits.  Affect is congruent with content of speech.  He is well-groomed his attire is appropriate and he appears his stated age.  He maintains positive eye contact and was cooperative throughout the session.  Mood is stable.      Suicidal/Homicidal Ideation present: None Reported This Session    Patient's impression of their current status: I had a lot of thoughts going through my mind since I decided to end this relationship but notes the right decision I been getting a lot of support from family and friends and I am struggling to figure out how to deal with how stupid I feel and how I feel he should have done this a long time ago.    Therapist impression of patients current state: Today's session was  spent helping patient processed his above-mentioned thoughts and feelings about where he is in his process.  Efforts were made to help reduce self recrimination self-loathing and increased self acceptance and less judgment about his process.  At completion of today's session patient was more thoughtful and less condemning continuing his efforts towards self acceptance and self-love.    Type of psychotherapeutic technique provided: Insight oriented, Client centered, Solution-focused and CBT    Progress toward short term goals:Progress as expected, Patient continues to assess coping strategies and is working on continuing to decrease self criticism and self condemnation which perpetuates the cycle of self-medication and self-medication.    Review of long term goals: Not done at today's visit and Date of last review 7/30/2017    Diagnosis:   1. Major depressive disorder, recurrent episode, moderate with anxious distress    2. Alcohol abuse        Plan and Follow up: He continues to follow all MD medication recommendations as prescribed.  Additionally patient is continued to be encouraged to attend AA on a weekly basis.  Patient will also  the book the 4 agreements to begin working on issues related to integrity and authenticity.      Discharge Criteria/Planning: Patient will continue with follow-up until therapy can be discontinued without return of signs and symptoms.    Nelia aFustin 12/4/2017      This note was created with help of Dragon dictation software. Grammatical / typing errors are not intentional and inherent to the software.

## 2021-06-15 NOTE — PROGRESS NOTES
Mental Health Visit Note    1/8/2018    Start time: 0700    Stop Time: 0800   Session # 1    Primitivo Melchor is a 42 y.o. male is being seen today for    Chief Complaint   Patient presents with     Anxiety     Depression     Alcohol Problem     Follow-up   .     New symptoms or complaints: Patient comes to therapy today to address ongoing issues associated with depression anxiety and an alcohol problem.  At today's session patient reports that he is increasing his participation at work and feels content with the changes he is making at work.  He indicates that he continues to struggle with some of his depression and anxiety although symptoms are decreased and he continues to struggle with establishing a sobriety recovery program for himself.  Patient's thoughts feelings and responses were processed throughout the session.    Functional Impairment:   Personal: 2  Family: 0  Work: 2  Social:2    Clinical assessment of mental status: Patient is alert and oriented ×3 with no evidence of impairment in orientation memory or judgment.  Fund of knowledge is adequate.  Thought content is absent hallucinations delusions and psychosis.  Mood is stable.  Affect is congruent with content of speech.  He maintains positive eye contact and was cooperative throughout the session.  He is well-groomed his attire is appropriate and he appears his stated age.    Suicidal/Homicidal Ideation present: None Reported This Session    Patient's impression of their current status: I feel like I am getting much better at supporting the changes that I am making.  I know that I need to go back to my AA group and while I have cut down on my drinking I have not yet been really ready to go to my  AA group.  I know that my process is significantly improved but that my participation in AA would be even better for me just have not quite come to terms with her except in the fact that I am an alcoholic.    Therapist impression of patients current state:  This patient continues to make good progress.  He has consistently struggled with the role of alcohol in his life and step 1 of the AA program.  It appears that he is making concerted effort in this regard and continues to make changes in other areas of his life that support a healthier lifestyle.    Type of psychotherapeutic technique provided: Insight oriented, Client centered, Solution-focused and CBT    Progress toward short term goals:Progress as expected, Progress is slow and steady short-term goals of attending AA continue to be difficult to achieve but we continue to hold that out as a goal.    Review of long term goals: Not done at today's visit and Date of last review 12/18/2017    Diagnosis:   1. Major depressive disorder, recurrent episode, moderate with anxious distress    2. Alcohol abuse        Plan and Follow up: Patient is scheduled for therapy every other week.  He continues to work with his medical providers regarding medication dose and follow through.  Additionally he is working with his medical provider regarding his CPAP machine and continues to make lifestyle changes regarding diet and exercise to address prediabetic conditions.  Patient continues to journal thoughts feelings and responses and continues to work with information found in the book waking, a story of trauma and recovery.      Discharge Criteria/Planning: Patient will continue with follow-up until therapy can be discontinued without return of signs and symptoms.    Nelia Faustin 1/8/2018      This note was created with help of Dragon dictation software. Grammatical / typing errors are not intentional and inherent to the software.

## 2021-06-15 NOTE — PROGRESS NOTES
Mental Health Visit Note    1/29/2018    Start time: 0700    Stop Time: 0800   Session # 2    Primitivo Melchor is a 42 y.o. male is being seen today for    Chief Complaint   Patient presents with     Anxiety     Depression     Alcohol Problem     Follow-up   .     New symptoms or complaints: Patient comes to therapy today to continue to address ongoing issues associated with anxiety depression and alcohol abuse.  At today's session patient continues to address underlying medical issues related to high triglycerides and sleep apnea.  Patient talked about the impact these 2 medical issues have for him and his mental health as well as medication to address these issues on physical level are also assisting in a decrease of alcohol use as he cannot mix alcohol with his medications.  Patient also reviewed significant changes within his personal relationships and social relationships.    Functional Impairment:   Personal: 2  Family: 2  Work: 2  Social:2    Clinical assessment of mental status: Patient is alert and oriented ×3 with no evidence of impairment in orientation memory or judgment.  Fund of knowledge is adequate.  Thought content is absent hallucinations delusions and psychosis.  Thought process, concentration, speech and language, attention, and motor activity are all within normal limits.  Affect is congruent with content of speech.  He maintains positive eye contact and was cooperative throughout the session.  Mood is stable.  He is well-groomed his attire is appropriate and he appears his stated age.    Suicidal/Homicidal Ideation present: None Reported This Session    Patient's impression of their current status: Patient states I struggle with feeling responsible for other people and I struggle with taking care of myself and I am starting to see because my physical health needs me to stop drinking and to eat better that if I do not I am going to run into some serious trouble.  We have a history of heart  disease on my father's side of the family and I do not want to die at an early age like my grandfather did he  at the age of 54.  So you might say I am been scared him to change but I welcome it because of been really struggling with changing this behavior on my own.    Therapist impression of patients current state: This patient continues to work with his own insights and understandings regarding his behaviors and choices.  His decision to seek medical help by going to get a physical as well as addressing his sleep apnea has caused this patient to begin to take a look at his weight physical choices he has been making and its impact for his functioning on a physical level.  He continues to struggle with issues of self worth and self care but is utilizing the medical system as a way to help increase his awareness about the impact his his choices on his overall health.  He is making progress.  He utilizes therapy as a way to deal with some of the emotional fallout of those choices and beliefs.    Type of psychotherapeutic technique provided: Insight oriented, Client centered, Solution-focused and CBT    Progress toward short term goals:Progress as expected, Patient is responsive to CBT interventions looking at underlying beliefs about self and others.    Review of long term goals: Not done at today's visit and Date of last review 2017    Diagnosis:   1. Major depressive disorder, recurrent episode, moderate with anxious distress    2. Alcohol abuse        Plan and Follow up: Patient is scheduled for therapy every 2 weeks.  He continues to follow all MD medication recommendations as prescribed.  Additionally he continues to work with information in the AA big book and continues to consider returning to his men's AA group.      Discharge Criteria/Planning: Patient will continue with follow-up until therapy can be discontinued without return of signs and symptoms.    Nelia Faustin 2018

## 2021-06-16 PROBLEM — G47.33 OSA (OBSTRUCTIVE SLEEP APNEA): Status: ACTIVE | Noted: 2018-02-07

## 2021-06-16 NOTE — PROGRESS NOTES
Spoke with Patient this morning, pt unable to attend therapy as he has the Flu, symptoms started yesterday, Sunday, and patient had no way of leaving a message with clinic in order to cancel.  Spoke with patient at 8 am before clinic line opened this morning at 830 am.

## 2021-06-16 NOTE — PROGRESS NOTES
Mental Health Visit Note    3/26/2018    Start time: 0710    Stop Time: 0805   Session # 4    Primitivo Melchor is a 43 y.o. male is being seen today for    Chief Complaint   Patient presents with     Depression     Anxiety     Alcohol Problem     Follow-up   .     New symptoms or complaints: Patient comes to therapy today to address ongoing issues associated with major depressive disorder with anxious features.  He continues to engage in a process of coming to terms with his problem with alcohol.  At today's session patient reports that he was last seen in this office 1 month ago and he is aware that he was very close to addressing his alcohol issues when he took a break from therapy.  He comes into therapy today clear that as long as he is drinking he is not going to be taking care of some of the other underlying issues in an effective manner.  Today's session was spent looking at where he is in his overall process as well as helping clarify small changes that he can make in his schedule to improve overall self-care to address the alcohol problem.  Patient's thoughts feelings and responses were processed throughout the session.    Functional Impairment:   Personal: 2  Family: 0  Work: 2  Social:3    Clinical assessment of mental status: Patient is alert and oriented ×3 with no evidence of impairment in orientation memory or judgment.  Fund of knowledge is adequate.  Mood is stable.  He maintains positive eye contact and was cooperative throughout the session.  He is well-groomed his attire is appropriate and he appears his stated age.  At today's session patient looked tired and his affect was congruent with the content of speech at times he was tearful.  Thought content is absent hallucinations delusions and psychosis.  Thought process, concentration, speech and language, attention, and motor activity are all within normal limits.    Suicidal/Homicidal Ideation present: None Reported This Session    Patient's  impression of their current status: Patient states I made some decisions about how I am going to structure my time and thought I would try it for the next 20 days.  I made arrangements with my work schedule that I will work a 10-7 shift every other Monday so that I can resume therapy I made arrangements in my work schedule so that I am going to be able to attend my men's group on Thursdays and I have made arrangements to work the 10-7 shift on Fridays.  It is very clear to me that I need to make some changes in just the structure of my day-to-day activities to make it possible to really begin to address the alcohol problem.    Therapist impression of patients current state: At today's session patient came in prepared to make some decisions about his lifestyle choices and has already set things in motion to create possibilities and openings for improved self-care.  The focus and emphasis of today's work was to amplify the positive forward changes that he is making as there is little information to be learned in going over choices that have sustained his alcohol use or dysfunctional relationships.  The more that patient can identify from his strength and resiliency the greater chance he has for sustaining long-term recovery this was information provided to the patient and he is agreeable to this plan.  Additionally he is very aware that if he does not address his alcoholism the rest of it is problematic for him.  He appears to be more open to identifying the difficulty that he has with his drinking and more willing to engage in a 12-step recovery program.    Type of psychotherapeutic technique provided: Insight oriented, Client centered, Solution-focused and CBT    Progress toward short term goals:Progress as expected, Patient is more open to engaging in the first step of the 12 step recovery program progress is guarded    Review of long term goals: Not done at today's visit and Date of last review  12/18/2018    Diagnosis:   1. Major depressive disorder, recurrent episode, moderate with anxious distress    2. Alcohol abuse        Plan and Follow up: Patient is scheduled for therapy every other week.  He will resume attending his men's group on Thursdays as well as looking into the possibility of another AA group on Saturdays or Sundays.  He will resume working with information in the AA big book as well as breathing underwater 12 steps and spirituality for discussion next session.  Patient continues to operate a very high functional level and continues to maintain employment as well as solid social relationships that are not centered around drinking.      Discharge Criteria/Planning: Patient will continue with follow-up until therapy can be discontinued without return of signs and symptoms.    Nelia Faustin 3/26/2018
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
## 2021-06-17 NOTE — PROGRESS NOTES
Mental Health Visit Note    4/23/2018    Start time: 0700    Stop Time: 0800   Session # 6    Primitivo Melchor is a 43 y.o. male is being seen today for    Chief Complaint   Patient presents with     Anxiety     Depression     Alcohol Problem     Follow-up   .     New symptoms or complaints: Patient comes to therapy today to address ongoing issues associated with major depressive disorder with anxious features and an alcohol problem.  At today's session patient talked about the challenges of ending a long-term relationship as well as the difficulties he has his self image and feeling confident and accepted both in his peer group and by himself.  Patient talked about the self-medication through the use of alcohol and his growing agitation and disappointment with that choice and a desire to make changes in his coping strategies and coping abilities.  Patient sets feelings and beliefs and responses were processed throughout the session.    Functional Impairment:   Personal: 2  Family: 0  Work: 2  Social:1    Clinical assessment of mental status: Patient is alert and oriented x3 with no evidence of impairment in orientation memory or judgment.  Fund of knowledge is adequate.  Thought content is absent hallucinations delusions and psychosis.  Thought process, concentration, speech and language, attention, and motor activity are all within normal limits.  Affect is congruent with the content of speech.  He is well-groomed his attire is appropriate and appears his stated age.  He maintains positive eye contact and was cooperative throughout the session.  Mood is stable.    Suicidal/Homicidal Ideation present: None Reported This Session    Patient's impression of their current status: I am getting tired of telling myself all these negative thoughts about what a loser I am going to always be alone and I know that that comes on the heels of my drinking so probably the biggest thing holding the back from making some  significant changes as the drinking and and I need to make a decision about that.    Therapist impression of patients current state: This patient continues to work on developing insight and self acceptance he is identified that the drinking is barrier to making significant changes in his life.  His drinking has decreased substantially yet the impact on outcome in terms of how he feels about himself when he does drink is extremely negative and self sabotaging.  This was addressed at length in the session and some determination needs to be made as to whether or not treatment is necessary if patient is unwilling or unable to attend AA on his own.  We will see what he chooses in between sessions and this will be addressed at next session.    Type of psychotherapeutic technique provided: Insight oriented, Client centered, Solution-focused and CBT    Progress toward short term goals:Progress as expected, Progress is being made although slowly because of some self sabotaging behaviors through his alcohol abuse.    Review of long term goals: Not done at today's visit and Date of last review 12/18/2017    Diagnosis:   1. Major depressive disorder, recurrent episode, moderate with anxious distress    2. Alcohol abuse        Plan and Follow up: Patient is scheduled for therapy every other week.  He will attend 2 AA meetings between now and next session and journal thoughts feelings and observations as well as given some consideration to participating in the treatment program this to be discussed at next session.  Treatment will be on an outpatient basis this patient is a functional alcohol abuser/possible alcoholic.      Discharge Criteria/Planning: Patient will continue with follow-up until therapy can be discontinued without return of signs and symptoms.    Nelia Faustin 4/23/2018
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
## 2021-06-17 NOTE — PROGRESS NOTES
Mental Health Visit Note    4/9/2018    Start time: 0700    Stop Time: 0800   Session # 5    Primitivo Melchor is a 43 y.o. male is being seen today for    Chief Complaint   Patient presents with     Depression     Anxiety     Alcohol Problem     Follow-up   .     New symptoms or complaints: Patient comes to therapy today to address ongoing issues associated with major depressive disorder with anxious features.  Additionally continues to struggle with alcohol as a problem.  Patient indicates they have been drinking less but it is still a problem for me because every time I drink I disconnect from myself and I start to feel really bad about myself.  Patient's thoughts feelings and responses were processed throughout the session.    Functional Impairment:   Personal: 2  Family: 0  Work: 2  Social:2    Clinical assessment of mental status: Patient is alert and oriented ×3 with no evidence of impairment in orientation memory or judgment.  Fund of knowledge is adequate.  Thought process, concentration, speech and language, attention, and motor activity are all within normal limits.  Affect is congruent with the content of speech.  He maintains positive eye contact and was thoughtful and cooperative throughout the session.  He is well-groomed his attire is appropriate and he appears his stated age.  Mood is stable.    Suicidal/Homicidal Ideation present: None Reported This Session    Patient's impression of their current status: I am getting pretty sick and tired of doing what I am doing and I know that if I would start going to a meeting things would start to change but I have some reluctance to change I had like my girlfriend to make those changes and I am struggling to understand why I want so much for her to understand, I guess bottom-line is if she understood what I was trying to do it would change her behavior and that would make it easier for me if she changed.    Therapist impression of patients current state:  This patient is finally being able to see the cyclical nature of his thought process and his sense of overall helplessness and powerlessness.  We continue to utilize interventions to interrupt the sense of powerlessness and helplessness and continue to encourage patient to attend AA which would provide a pathway of recovery and to counterbalance to his passivity and helplessness.  He continues to decline treatment at this time.    Type of psychotherapeutic technique provided: Insight oriented, Client centered, Solution-focused and CBT    Progress toward short term goals:Progress as expected, Progress is continuous but slow as patient continues to struggle with making some decisions about his drinking.    Review of long term goals: Not done at today's visit and Date of last review 12/18/2018    Diagnosis:   1. Major depressive disorder, recurrent episode, moderate with anxious distress    2. Alcohol abuse        Plan and Follow up: Patient is scheduled for follow-up in 2 weeks.  It is again recommended that he attend 30 meetings in 30 days.  He will continue to work with information in the 12-step book and will again track the volume of his use.  At this time he declines interventions through treatment.      Discharge Criteria/Planning: Patient will continue with follow-up until therapy can be discontinued without return of signs and symptoms.    Nelia Faustin 4/9/2018

## 2021-06-18 NOTE — PROGRESS NOTES
Mental Health Visit Note    6/4/2018    Start time: 0700    Stop Time: 0800   Session # 9    Primitivo Melchor is a 43 y.o. male is being seen today for    Chief Complaint   Patient presents with     Depression     Anxiety     Alcohol Problem     Follow-up   .     New symptoms or complaints: Patient comes to therapy today to address ongoing issues associated with major depressive disorder with anxious features.  At today's session patient also continues to evaluate his drinking and problems with alcohol.  Patient talked about being on [place] and being nature for 4 days and feeling good about himself.  Patient also begin to evaluate the impact of his chemical use and its interference in allowing him to continue to resolve some of his underlying issues with anxiety and depression.  Patient also reviewed historical events utilize somatic processing as a way to release some of his stored unexpressed emotional components of his depression and anxiety.    Functional Impairment:   Personal: 1  Family: 1  Work: 2  Social:2    Clinical assessment of mental status: Patient is alert and oriented ×3 with no evidence of impairment in orientation memory or judgment.  Fund of knowledge is adequate.  Mood is stable.  He maintains positive eye contact and was cooperative throughout the session.  He is well-groomed his attire is appropriate and he appears his stated age.  Thought content is absent hallucinations delusions and psychosis.  Thought process, concentration, speech and language, attention, and motor activity are all within normal limits.  Affect is congruent with content of speech    Suicidal/Homicidal Ideation present: None Reported This Session    Patient's impression of their current status: I feel better than when I came in I am beginning to understand how my self-criticism and self judgment really keeps me trapped in feeling bad about myself instead of breaking some of the inner conversations.  Today was  helpful in terms of looking at a bunch of believes that I did not know I had about myself.  This patient continues to make good progress.    Therapist impression of patients current state: His overall levels of depression and anxiety have reduced significantly since he first started therapy he continues to reduce the actual frequency and amount of his alcohol use but still maintains some reluctance to bringing in aspects of the recovery program that would help him continue to expand his coping abilities.  We continue to address this along with utilizing somatic processing and CBT interventions to promote overall health and well-being.    Type of psychotherapeutic technique provided: Insight oriented, Client centered, Solution-focused, CBT and Somatic processing    Progress toward short term goals:Progress as expected, Patient continues to report overall increase in sense of well-being and ability to utilize insights with establishing coping strategies that are beginning to address underlying beliefs about self and others.    Review of long term goals: Not done at today's visit and Date of last review 12/18/2017, treatment plan will be updated at next session    Diagnosis:   1. Major depressive disorder, recurrent episode, moderate with anxious distress        Plan and Follow up: Patient is scheduled for therapy every other week.  He continues to follow all MD medication recommendations as prescribed.  Additionally it is further recommended that patient attend at least one AA meeting this week he continues to revisit information and breathing underwater spirituality and 12 step program for discussion next week.      Discharge Criteria/Planning: Patient will continue with follow-up until therapy can be discontinued without return of signs and symptoms.    Nelia Faustin 6/4/2018

## 2021-06-18 NOTE — PROGRESS NOTES
"Mental Health Visit Note    5/14/2018    Start time: 0700    Stop Time: 0800   Session # 8    Primitivo Melchor is a 43 y.o. male is being seen today for    Chief Complaint   Patient presents with     Depression     Anxiety     Follow-up   .     New symptoms or complaints: Patient comes to therapy today to address ongoing issues associated with major depressive disorder with anxious features as well as addressing problem with alcohol.  At today's session patient reports he is sick and tired of being sick and tired and is ready to do something on his own behalf.  Patient reports that he almost went to a meeting patient also reports that his significant other who is also  is becoming increasingly more difficult as the relationship between the 2 of them is nearing its end.  Patient's thoughts feelings and responses were processed throughout the session.    Functional Impairment:   Personal: 2  Family: 0  Work: 2  Social:2    Clinical assessment of mental status: Patient is alert and oriented ×3 with no evidence of impairment in orientation memory or judgment.  Fund of knowledge is adequate.  Thought content is absent hallucinations delusions and psychosis.  Thought process, concentration, speech and language, attention, and motor activity are all within normal limits.  Affect is congruent with content of speech.  He is well-groomed his attire is appropriate and he appears his stated age.  He was cooperative throughout the session and maintained positive eye contact.  His mood was stable.  It is evident that he was tired this morning in his demeanor and presentation.    Suicidal/Homicidal Ideation present: None Reported This Session    Patient's impression of their current status: Patient states ,\"over the weekend I had a work event going away party for 1 of my colleagues and I knew that she would be at this work event because we work together.  I did not stay too long and after I left she has had me a bunch of " "text messages wondering if I was mad at her and I just decided I did not want to respond.  When I did not respond she then started sending me text messages saying she was going to overdose and kill herself.  I then engaged with that contacted her best friend and went to sleep.  She then like it that I did not sit up all night texting her wondering what was wrong with her but I really felt like this was not a serious message she got my attention I do not want to keep living this way I know that I need to address my drinking because it is my drinking that keeps me in this relationship and I do not need this level of drama I need to start taking care of myself.\"  Patient indicated that while it is embarrassing and difficult to look at he is starting to see more clearly the dysfunction in this relationship and the choices that he has been making.    Therapist impression of patients current state: This patient is continuing to make progress it is been a challenge for him to address his own underlying issues of abandonment and lack of self-confidence and self worth and look at all the ways in which she tries to avoid dealing with that directly and heal it through relationships with other women.  His insight into this tendency is certainly growing and while he is in an uncomfortable place he is making significant progress towards feeling enough discomfort that he is willing to make changes.  Is very responsive to CBT interventions as well as somatic processing.    Type of psychotherapeutic technique provided: Insight oriented, Client centered, Solution-focused, CBT and Somatic processing    Progress toward short term goals:Progress as expected, Progress is continuing to move forward as patient's insight and understanding continues to expand.    Review of long term goals: Not done at today's visit and Date of last review 12/18/2017    Diagnosis:   1. Major depressive disorder, recurrent episode, moderate with anxious " distress    2. Alcohol abuse        Plan and Follow up: Patient is scheduled for therapy in 2 weeks due to vacation schedule of this provider.  Additionally he will attend AA this week and continues to journal thoughts feelings and observations for discussion each session.      Discharge Criteria/Planning: Patient will continue with follow-up until therapy can be discontinued without return of signs and symptoms.    Nelia Faustin 5/14/2018
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
## 2021-06-19 NOTE — PROGRESS NOTES
Outpatient Mental Health Treatment Plan    Name:  Primitivo Melchor  :  1975  MRN:  [id number]    Treatment Plan:  Updated Treatment Plan  Intake/initial treatment plan date:  2018  Benefit and risks and alternatives have been discussed: Yes  Is this treatment appropriate with minimal intrusion/restrictions: Yes  Estimated duration of treatment:  Approximately 12 sessions.  Anticipated frequency of services:  Every 2 weeks  Necessity for frequency: This frequency is needed to establish therapeutic goals and for continuity of care in order to monitor progress.  Necessity for treatment: To address cognitive, behavioral, and/or emotional barriers in order to work toward goals and to improve quality of life.    Session Type: Patient is presenting for an Individual session.    Plan:           ?   ? Anxiety    Goal:  Decrease average anxiety level from 5 to 3.   Strategies: ? [x]Learn and practice relaxation techniques and other coping        strategies (e.g., thought stopping, reframing, meditation)     ? [x] Increase involvement in meaningful activities     ? [x] Discuss sleep hygiene     ? [x] Explore thoughts and expectations about self and others     ? [x] Identify and monitor triggers for panic/anxiety symptoms     ? [x] Implement physical activity routine (with physician approval)     ? [x] Consider introduction of bibliotherapy and/or videos     ? [x] Continue compliance with medical treatment plan (or explore          barriers)                                       [x]     ?Degree to which this is a problem: 1  Degree to which goal is met: 2  Date of Review: 2018     ? Depression    Goal:  Decrease average depression level from 6 to 3.   Strategies:    ?[x] Decrease social isolation     [x] Increase involvement in meaningful activities     ?[x] Discuss sleep hygiene     ?[x] Explore thoughts and expectations about self and others     ?[x] Process grief (loss of significant person, independence,  role,        etc.)     ?[x] Assess for suicide risk     ?[x] Implement physical activity routine (with physician approval)     [x] Consider introduction of bibliotherapy and/or videos     [x] Continue compliance with medical treatment plan (or explore         barriers)       ?  Degree to which this is a problem: 1  Degree to which goal is met: 2  Date of Review: 12/20/2018           Functional Impairment:  1=Not at all/Rarely  2=Some days  3=Most Days  4=Every Day    Personal : 2  Family : 1  Social : 2   Work/school : 2    Diagnosis:   Major depressive disorder, recurrent, moderate with anxious features      WHODAS 2.0 12-item version 18.75%      Scores presented in qualifiers to represent level of disability.  MILD Problem (slight, low, ...) 5-24%    H1= 30  H2= 1  H3= 8        Clinical assessments and measures completed:. CHASE-7, PHQ-9 and CAGE-AID     Strengths:  Intelligence, family support, social support, work support, willingness to see the truth  Limitations:  fear about being enough  Cultural Considerations: problem with alcohol, early childhood neglect    Persons responsible for this plan: Patient and Provider            Psychotherapist Signature           Patient Signature:              Guardian Signature             Provider: Performed and documented by ORION Azevedo   Date:  8/20/2018

## 2021-06-19 NOTE — LETTER
Letter by Nelia Faustin LICSW at      Author: Nelia Faustin LICSW Service: -- Author Type: --    Filed:  Encounter Date: 9/17/2019 Status: (Other)         September 17, 2019        Primitivo Melchor  [address]  [facility] [address]             Dear Primitivo ,      I hope this letter finds you well.  I have not heard from you since  March 18,2019      [facility] and agency policies indicate the need to close files that have shown no activity for 3 months.  If you would like to reschedule please call [phone] to set up an appointment.   If you prefer not to reschedule at this time please know you can reopen your file at a later date or seek counseling with another mental health professional.  If you would like a referral or assistance in locating another mental health agency or professional please let us know.        Sincerely,          Nelia HEAD, [facility]  [address] [place] [address]

## 2021-06-19 NOTE — PROGRESS NOTES
Mental Health Visit Note    8/6/2018    Start time: 0805    Stop Time: 0900   Session # 10    Session Type: Patient is presenting for an Individual session.    Primitivo Melchor is a 43 y.o. male is being seen today for    Chief Complaint   Patient presents with     Depression     Anxiety     Alcohol Problem     Follow-up   .     New symptoms or complaints: Patient returns to therapy having last been in this office June 4.  Patient indicated that he has been making some decisions about ending a toxic relationship as well as decreasing his use of alcohol.  Patient comes to therapy to address underlying issues of generalized anxiety and major depressive disorder recurrent moderate to mild.  Patient wanted to receive support for the changes that he is implementing and boundaries he is setting himself and his former girlfriend.  Patient's thoughts feelings and responses were processed throughout the session.    Functional Impairment:   Personal: 2  Family: 0  Work: 2  Social:2    Clinical assessment of mental status: Patient is alert and oriented ×3 with no evidence of impairment in orientation memory or judgment.  Fund of knowledge is adequate.  Thought content is absent hallucinations delusions and psychosis.  Thought process, concentration, speech and language, attention, and motor activity are all within normal limits.  Affect is congruent with content of speech and he maintains positive eye contact and was cooperative throughout the session.  Mood is stable.  He is well-groomed his attire is appropriate and he appears his stated age.    Suicidal/Homicidal Ideation present: None Reported This Session    Patient's impression of their current status: As a little bit nervous about coming in because I am been here in about 8 weeks but I really felt like I needed to take a break both to catch up on my bills financially as well as to figure out what I am actually doing what I want to do for myself.  I feel really good  about implementing the changes between myself and my ex-girlfriend and I just want to keep moving forward.  Really beginning to understand that the relationship was a problem and underneath that is my drinking problem and underneath that and I are all the issues that we talked about but I just need to make the changes that I can right now today.    Therapist impression of patients current state: It is clear this patient has been processing much of the information he has learned over the time that he spent in individual psychotherapy.  He took 2 months off as a mechanism for internalizing the information and beginning to implement actual behavioral changes.  He is utilizing therapy on a supportive basis and is scheduled for 3 follow-up appointments each spaced 2-3 weeks apart at which time he wishes to reassess any ongoing needs for therapy.  Today session was spent supporting patient in the positive changes he is making and helping him to identify overall treatment goals.  Treatment plan will be updated at next session.    Type of psychotherapeutic technique provided: Insight oriented, Client centered, Solution-focused and CBT    Progress toward short term goals:Progress as expected, Progress towards short-term goals of the treatment plan were suspended this patient is re-engaging with therapy after a 2 month break.  Therapeutic rapport was reestablished and treatment goals will be updated at next session.    Review of long term goals: Not done at today's visit    Diagnosis:   1. Major depressive disorder, recurrent episode, moderate with anxious distress (H)    2. Alcohol abuse        Plan and Follow up: Patient is scheduled for therapy in a few weeks.  He continues to follow all MD medication recommendations as prescribed.  Additionally patient will return to his AA men's group as well as continue to work with information found in the book the alchemist.  Patient is working on establishing a recovery program for  himself utilizing information from a variety of sources that will allow him to simply focus on choices he is making one day at a time.  He will bring in thoughts feelings and observations for discussion next session.      Discharge Criteria/Planning: Patient will continue with follow-up until therapy can be discontinued without return of signs and symptoms.    Nelia Faustin 8/6/2018

## 2021-06-20 NOTE — PROGRESS NOTES
Called pt regarding No Show of yesterday, offered to reschedule for next week, informed about No Show policy.  Pt called back in late morning to inform that he had a unscheduled meeting for Monday and was unable to leave a message on Friday as the clinic does not have a method for leaving messages.  He rescheduled for week of the 24th.

## 2021-06-20 NOTE — PROGRESS NOTES
Mental Health Visit Note    10/8/2018    Start time: 0800    Stop Time: 09   Session # 13    Session Type: Patient is presenting for an Individual session.    Primitivo Melchor is a 43 y.o. male is being seen today for    Chief Complaint   Patient presents with     Depression     Anxiety     Follow-up   .     New symptoms or complaints: Patient comes to therapy today as follow-up for major depressive disorder with anxious features.  At today's session patient reports that his grandfather  over the weekend and that was to be expected.  He is 37-year-old cousin also  over the weekend and that was unexpected.  He  of what he the patient thinks may be an overdose.  Patient processed thoughts feelings reactions responses and believes as he prepares for a double family  this coming weekend.    Functional Impairment:   Personal: 2  Family: 3  Work: 0  Social:0    Clinical assessment of mental status: Patient is alert and oriented x3 with no evidence of impairment in orientation memory or judgment.  Fund of knowledge is adequate.  Thought content is absent hallucinations delusions and psychosis.  Thought process, concentration, speech and language, attention, and motor activity are all within normal limits.  Affect is congruent with content of speech.  He maintains positive eye contact and was cooperative throughout the session.  Mood is stable.    Suicidal/Homicidal Ideation present: None Reported This Session    Patient's impression of their current status: Patient states it was shocking for me when my cousin  I grew up very close to he and his brothers.  His older brother and I were close and my younger brother and his cousin who  were close.  It is very hard for my younger brother and I have so many mixed emotions because he struggled with being in and out of treatment so many times and this reality of alcoholism in my grandfather my uncles and my father myself it is eye-opening for  me.    Therapist impression of patients current state: This patient continues to address his own ongoing struggles with alcoholism and the death of his cousin who was very close to him as a brother might be has created a shockwave for this patient.  He spent the session processing some of his thoughts feelings fears and emotions related to the staff as well as anticipatory grief and anxiety about returning home for the family .  He will follow up with another individual session after the  to continue to process the impact of this death.    Type of psychotherapeutic technique provided: Insight oriented, Client centered, Solution-focused and CBT    Progress toward short term goals:Progress as expected, Progress towards making long-term changes in the patient's own sobriety and recovery continue as planned.  Patient continues to maintain abstinence and consideration of lifestyle changes associated with acknowledging his drinking is an issue.    Review of long term goals: Not done at today's visit and Date of last review 2018    Diagnosis:   1. Major depressive disorder, recurrent episode, moderate with anxious distress (H)    2. Alcohol abuse        Plan and Follow up: Patient is scheduled for therapy on a weekly basis.  He continues to follow all MD medication recommendations as prescribed.  Additionally patient will continue to attend his AA group 1 or 2 times weekly and will begin reading in the big book for discussion next session.      Discharge Criteria/Planning: Patient will continue with follow-up until therapy can be discontinued without return of signs and symptoms.    Nelia Faustin 10/8/2018

## 2021-06-20 NOTE — PROGRESS NOTES
Mental Health Visit Note    9/24/2018    Start time: 0700    Stop Time: 0800   Session # 12    Session Type: Patient is presenting for an Individual session.    Primitivo Melchor is a 43 y.o. male is being seen today for    Chief Complaint   Patient presents with     Depression     Anxiety     Alcohol Problem     Follow-up   .     New symptoms or complaints: Patient comes to therapy today to address ongoing issues associated with moderate to mild with anxious distress.  At today's session patient talked about planning to do 3030, 30 AA meetings in 30 days in an effort to address his issues with alcohol abuse.  Patient declines chemical dependency or treatment for alcoholism at this time.  Patient's use and motivations as well as anxiety and depression fear of loneliness and relationship issues was explored at length    Functional Impairment:   Personal: 2  Family: 2  Work: 2  Social:2    Clinical assessment of mental status: Patient is alert and oriented x3 with no evidence of impairment in orientation memory or judgment.  Fund of knowledge is adequate.  His thought content is absent hallucinations delusions and psychosis.  Thought process, motor activity, and affect were all within normal limits.  He maintains positive eye contact and was cooperative throughout the session.  Mood is stable.  He is well-groomed his attire is appropriate and he appears his stated age.  At times he was tearful when he was able to regulate mood successfully in session.    Suicidal/Homicidal Ideation present: None Reported This Session    Patient's impression of their current status: Patient states I feel significantly better than when I walked in, my body is: And I think I am coming to terms with the fact that I am a person who cannot drink.  I feel like we have a good plan in place and I know which meetings and getting attend and feel my motivation for change is increasing.    Therapist impression of patients current state: This  patient continues to make good progress.  Today session was notable in terms of patient really beginning to understand what it means to work a recovery program progress has been made towards interrupting his intense over focus on the needs of other people as he is more willing to invest in his own recovery process.  He has been making changes in his workplace and having conversations with upper management a performance improvement plan and is committed to making changes one step at a time.    Type of psychotherapeutic technique provided: Insight oriented, Client centered, Solution-focused, CBT and Somatic Processing    Progress toward short term goals:Progress as expected, responds well to CBT interventions, progress with boundaries between self and others    Review of long term goals: Not done at today's visit and Date of last review 8/20/2018    Diagnosis:   1. Major depressive disorder, recurrent episode, moderate with anxious distress (H)    2. Alcohol abuse        Plan and Follow up: Patient is scheduled for therapy every 2-3 weeks.  He continues to follow all MD medication recommendations as prescribed.  Additionally patient will begin to implement a plan to attend AA meeting once a day in addition to his work schedule.  He continues to journal thoughts feelings and responses for discussion each session.  He continues to maintain employment and continues to function well at work.      Discharge Criteria/Planning: Patient will continue with follow-up until therapy can be discontinued without return of signs and symptoms.    Nelia Faustin 9/24/2018

## 2021-06-21 NOTE — PROGRESS NOTES
Mental Health Visit Note    10/29/2018    Start time: 0800    Stop Time: 0900   Session # 14    Session Type: Patient is presenting for an Individual session.    Primitivo Melchor is a 43 y.o. male is being seen today for    Chief Complaint   Patient presents with     Depression     Anxiety     Alcohol Problem     Follow-up   .     New symptoms or complaints: Patient comes to therapy today to continue to address ongoing issues associated with depression with anxious features.  At today's session patient talked about the impact of his cousin English .  Patient indicates he still does not know what his cousin  from but understood that chemical use was a contributing factor.  Patient wanted to process his own thoughts and feelings about his life choices and his own growing frustration with feeling judgmental about his life choices today.  Patient's thoughts feelings and responses were processed throughout the session as well as coping strategies and implementations for change.    Functional Impairment:   Personal: 3  Family: 0  Work: 2  Social:2    Clinical assessment of mental status: Patient is alert and oriented x3 with no evidence of impairment in orientation memory or judgment.  Fund of knowledge is adequate.  Mood is stable.  He maintains positive eye contact and was cooperative throughout the session.  He is well-groomed his attire is appropriate and he appears his stated age.  Thought content is absent hallucinations delusions and psychosis.  Thought process, concentration, speech and language, attention, and motor activity are all within normal limits.  Affect is congruent with content of speech.    Suicidal/Homicidal Ideation present: None Reported This Session    Patient's impression of their current status: I had not realized how nervous I was to see Michelle at the  but I I feel better leaving here this morning.  I am beginning to understand that seeing her really is allowing me to make peace  with the life choices that I have had, I did not realize how self-critical I had been.    Therapist impression of patients current state: This patient continues to make good progress today's efforts were spent helping patient reframe some of his experiences as well as address long held beliefs about himself and others.  He responds well to CBT interventions.  His alcohol use continues to be addressed he continues to decline treatment for his alcohol abuse and will return to his AA group.    Type of psychotherapeutic technique provided: Insight oriented, Client centered, Solution-focused and CBT    Progress toward short term goals:Progress as expected, Progress towards alleviating signs and symptoms of depression and anxiety were put on hold by the significant death in his family of origin efforts are being made to return towards continuing to assist patient in addressing the issues behind his depression and anxiety.    Review of long term goals: Not done at today's visit and Date of last review 8/20/2018    Diagnosis:   1. Major depressive disorder, recurrent episode, moderate with anxious distress (H)    2. Alcohol abuse        Plan and Follow up: Patient is scheduled for therapy in 2 weeks.  Patient is encouraged to attend AA and return to his men's group on a weekly basis.  Additionally patient is also encouraged to continue to follow up with his inquiries into finding a men's group to work with,  referral sources were provided.  Patient continues to journal thoughts feelings and observations for discussion each week.  He continues to follow all MD medication recommendations as prescribed      Discharge Criteria/Planning: Patient will continue with follow-up until therapy can be discontinued without return of signs and symptoms.    Nelia Faustin 10/29/2018

## 2021-06-25 NOTE — PROGRESS NOTES
Mental Health Visit Note    3/18/2019    Start time: 0800    Stop Time: 0900   Session # 1    Session Type: Patient is presenting for an Individual session.    Primitivo Melchor is a 44 y.o. male is being seen today for    Chief Complaint   Patient presents with     Depression     Anxiety     Alcohol Problem     Follow-up   .     New symptoms or complaints: Patient comes to therapy today to address ongoing issues associated with major depressive disorder with anxious features as well as continuing to address alcohol abuse.  At today's session patient reports continued decrease in his overall use of alcohol.  Patient was last seen in this office in October shortly after the unexpected death of his cousin.  Patient comes in today to process information regarding the diagnosis of colon cancer for his father.  Patient wanted to process some of his fears and anxieties and thoughts regarding his father's illness.  Patient's thoughts feelings and responses were processed throughout the session.    Functional Impairment:   Personal: 2  Family: 3  Work: 1  Social:1    Clinical assessment of mental status: Patient is alert and oriented x3 with no evidence of impairment in orientation, memory.  Affect is congruent with the content of speech and fund of knowledge is adequate.  Thought content is absent hallucinations delusions and psychosis.  Thought process, concentration, speech and language, attention, and motor activity are all within normal limits.  Mood is stable.  He maintains positive eye contact and was cooperative throughout the session.  He is well-groomed his attire is appropriate and he appears his stated age.    Suicidal/Homicidal Ideation present: None Reported This Session    Patient's impression of their current status: Feel significantly better than when I came in I had not realized how anxious I was about my dad's illness.  I think that I have made a lot of progress since we started working but I know I  needed to come in to sort through what I wanted to do I think I am going to go visit my dad.    Therapist impression of patients current state: This patient continues to utilize therapy as a resource.  He continues to be aware that his drinking is way to manage and/or avoid his emotional responses.  And patient continues to report a decrease in his overall use.  He is still resistant to attending AA.  He is able to integrate information from previous sessions in his coping strategies and continues to make progress.    Type of psychotherapeutic technique provided: Insight oriented, Client centered, Solution-focused and CBT    Progress toward short term goals:Progress as expected, Patient continues to work towards being able to have direct access to his own thoughts feelings and observations without utilizing alcohol to manage them.  He continues to be responsive to CBT interventions.    Review of long term goals: Long-term goals reviewed 3/18/2019 and Treatment Plan updated    Diagnosis:   1. Major depressive disorder, recurrent episode, moderate with anxious distress (H)    2. Alcohol abuse        Plan and Follow up: Patient is scheduled for therapy on an as-needed basis.  It is recommended that he attend AA on at least a weekly basis and continue to utilize the 12 steps as a means to help him with his recovery process as well as sorting through his own thoughts and feelings.  Patient continues to decline attending AA but will continue to bring in thoughts feelings and observations for discussion next session.      Discharge Criteria/Planning: Patient will continue with follow-up until therapy can be discontinued without return of signs and symptoms.    Nelia Faustin 3/18/2019
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
## 2023-04-14 NOTE — PROGRESS NOTES
Mental Health Visit Note    10/16/2017    Start time: 0700    Stop Time: 0800   Session # 18    Primitivo Melchor is a 42 y.o. male is being seen today for    Chief Complaint   Patient presents with     Anxiety     Depression     Alcohol Problem   .     New symptoms or complaints: Patient comes to therapy today to continue to address ongoing issues associated with major depressive disorder with anxious features.  Patient continues to monitor his alcohol use.  At today's session patient reports a fairly intense process with his mother's declining health and his ability to hold boundaries with her.  Patient's thoughts feelings and responses were discussed throughout the session.    Functional Impairment:   Personal: 2  Family: 1  Work: 2  Social:2    Clinical assessment of mental status: Patient is alert and oriented ×3 with no evidence of impairment in orientation memory or judgment.  Fund of knowledge is adequate.  Thought content is absent hallucinations delusions and psychosis.  Thought process, concentration, speech and language, attention, and motor activity are all within normal limits.  Affect is congruent with content of speech.  At times he was tearful.  Mood is stable.  He maintains positive eye contact and was cooperative throughout the session.    Suicidal/Homicidal Ideation present: None Reported This Session    Patient's impression of their current status: Patient states I am very clear that I do not want to get pulled into taking care of my mother she would love for me to take care of her pay her bills get her medication drive her places but I cannot do that I am really starting to see the manipulation and how that makes me feel and making better choices for myself    Therapist impression of patients current state: This patient continues to make good progress in his ability to name and identify dysfunctional relationship patterns as well as his participation in them is improving and his willingness to  take a hard look at self-care and what is necessary for him to continue to improve his functioning has improved.    Type of psychotherapeutic technique provided: Insight oriented, Client centered, Solution-focused and CBT    Progress toward short term goals:Progress as expected, Patient continues to address underlying issues associated with lack of self-care and lack of self expression.    Review of long term goals: Not done at today's visit and Date of last review 7/30/2017    Diagnosis:   1. Major depressive disorder, recurrent episode, moderate with anxious distress    2. Alcohol abuse        Plan and Follow up: Patient has resumed attending his men's group on Thursdays and continues to work with information in the big book relating to his attempts at self medicating.  He engages in therapy on a weekly or twice weekly process and continues to bring in observations insights and learnings as well as underlying beliefs and assumptions about himself for discussion each session.  CBT interventions continue to be used and are effective for this individual.      Discharge Criteria/Planning: Patient will continue with follow-up until therapy can be discontinued without return of signs and symptoms.    Nelia Faustin 10/16/2017      This note was created with help of Dragon dictation software. Grammatical / typing errors are not intentional and inherent to the software.   done

## 2024-05-20 ENCOUNTER — HOSPITAL ENCOUNTER (EMERGENCY)
Facility: CLINIC | Age: 49
Discharge: HOME | End: 2024-05-20
Attending: EMERGENCY MEDICINE | Admitting: EMERGENCY MEDICINE
Payer: COMMERCIAL

## 2024-05-20 VITALS
OXYGEN SATURATION: 96 % | HEIGHT: 71 IN | BODY MASS INDEX: 33.6 KG/M2 | WEIGHT: 240 LBS | TEMPERATURE: 98.6 F | HEART RATE: 71 BPM | RESPIRATION RATE: 14 BRPM | DIASTOLIC BLOOD PRESSURE: 92 MMHG | SYSTOLIC BLOOD PRESSURE: 166 MMHG

## 2024-05-20 DIAGNOSIS — S39.012A LOW BACK STRAIN, INITIAL ENCOUNTER: ICD-10-CM

## 2024-05-20 LAB
ALBUMIN SERPL BCG-MCNC: 4 G/DL (ref 3.5–5.2)
ALP SERPL-CCNC: 52 U/L (ref 40–150)
ALT SERPL W P-5'-P-CCNC: 24 U/L (ref 0–70)
ANION GAP SERPL CALCULATED.3IONS-SCNC: 12 MMOL/L (ref 7–15)
AST SERPL W P-5'-P-CCNC: 19 U/L (ref 0–45)
BASOPHILS # BLD AUTO: 0 10E3/UL (ref 0–0.2)
BASOPHILS NFR BLD AUTO: 1 %
BILIRUB SERPL-MCNC: 0.5 MG/DL
BUN SERPL-MCNC: 15.6 MG/DL (ref 6–20)
CALCIUM SERPL-MCNC: 8.8 MG/DL (ref 8.6–10)
CHLORIDE SERPL-SCNC: 105 MMOL/L (ref 98–107)
CREAT SERPL-MCNC: 1.16 MG/DL (ref 0.67–1.17)
DEPRECATED HCO3 PLAS-SCNC: 25 MMOL/L (ref 22–29)
EGFRCR SERPLBLD CKD-EPI 2021: 78 ML/MIN/1.73M2
EOSINOPHIL # BLD AUTO: 0.5 10E3/UL (ref 0–0.7)
EOSINOPHIL NFR BLD AUTO: 11 %
ERYTHROCYTE [DISTWIDTH] IN BLOOD BY AUTOMATED COUNT: 11.5 % (ref 10–15)
GLUCOSE SERPL-MCNC: 109 MG/DL (ref 70–99)
HCT VFR BLD AUTO: 39.3 % (ref 40–53)
HGB BLD-MCNC: 13.6 G/DL (ref 13.3–17.7)
IMM GRANULOCYTES # BLD: 0 10E3/UL
IMM GRANULOCYTES NFR BLD: 1 %
LYMPHOCYTES # BLD AUTO: 1.4 10E3/UL (ref 0.8–5.3)
LYMPHOCYTES NFR BLD AUTO: 32 %
MCH RBC QN AUTO: 30.6 PG (ref 26.5–33)
MCHC RBC AUTO-ENTMCNC: 34.6 G/DL (ref 31.5–36.5)
MCV RBC AUTO: 89 FL (ref 78–100)
MONOCYTES # BLD AUTO: 0.3 10E3/UL (ref 0–1.3)
MONOCYTES NFR BLD AUTO: 6 %
NEUTROPHILS # BLD AUTO: 2.2 10E3/UL (ref 1.6–8.3)
NEUTROPHILS NFR BLD AUTO: 49 %
NRBC # BLD AUTO: 0 10E3/UL
NRBC BLD AUTO-RTO: 0 /100
PLATELET # BLD AUTO: 149 10E3/UL (ref 150–450)
POTASSIUM SERPL-SCNC: 4 MMOL/L (ref 3.4–5.3)
PROT SERPL-MCNC: 6.2 G/DL (ref 6.4–8.3)
RBC # BLD AUTO: 4.44 10E6/UL (ref 4.4–5.9)
SODIUM SERPL-SCNC: 142 MMOL/L (ref 135–145)
WBC # BLD AUTO: 4.4 10E3/UL (ref 4–11)

## 2024-05-20 PROCEDURE — 36415 COLL VENOUS BLD VENIPUNCTURE: CPT | Performed by: EMERGENCY MEDICINE

## 2024-05-20 PROCEDURE — 99284 EMERGENCY DEPT VISIT MOD MDM: CPT | Mod: 25

## 2024-05-20 PROCEDURE — 250N000011 HC RX IP 250 OP 636: Performed by: EMERGENCY MEDICINE

## 2024-05-20 PROCEDURE — 250N000013 HC RX MED GY IP 250 OP 250 PS 637: Performed by: EMERGENCY MEDICINE

## 2024-05-20 PROCEDURE — 96374 THER/PROPH/DIAG INJ IV PUSH: CPT

## 2024-05-20 PROCEDURE — 85025 COMPLETE CBC W/AUTO DIFF WBC: CPT | Performed by: EMERGENCY MEDICINE

## 2024-05-20 PROCEDURE — 80053 COMPREHEN METABOLIC PANEL: CPT | Performed by: EMERGENCY MEDICINE

## 2024-05-20 RX ORDER — METHYLPREDNISOLONE 4 MG
TABLET, DOSE PACK ORAL
Qty: 21 TABLET | Refills: 0 | Status: SHIPPED | OUTPATIENT
Start: 2024-05-20

## 2024-05-20 RX ORDER — CYCLOBENZAPRINE HCL 5 MG
5-10 TABLET ORAL 3 TIMES DAILY PRN
Qty: 15 TABLET | Refills: 0 | Status: SHIPPED | OUTPATIENT
Start: 2024-05-20

## 2024-05-20 RX ORDER — HYDROMORPHONE HYDROCHLORIDE 1 MG/ML
0.5 INJECTION, SOLUTION INTRAMUSCULAR; INTRAVENOUS; SUBCUTANEOUS EVERY 10 MIN PRN
Status: DISCONTINUED | OUTPATIENT
Start: 2024-05-20 | End: 2024-05-20 | Stop reason: HOSPADM

## 2024-05-20 RX ORDER — CYCLOBENZAPRINE HCL 10 MG
10 TABLET ORAL ONCE
Status: COMPLETED | OUTPATIENT
Start: 2024-05-20 | End: 2024-05-20

## 2024-05-20 RX ADMIN — CYCLOBENZAPRINE 10 MG: 10 TABLET, FILM COATED ORAL at 09:24

## 2024-05-20 RX ADMIN — HYDROMORPHONE HYDROCHLORIDE 0.5 MG: 1 INJECTION, SOLUTION INTRAMUSCULAR; INTRAVENOUS; SUBCUTANEOUS at 09:24

## 2024-05-20 ASSESSMENT — ACTIVITIES OF DAILY LIVING (ADL)
ADLS_ACUITY_SCORE: 35

## 2024-05-20 ASSESSMENT — COLUMBIA-SUICIDE SEVERITY RATING SCALE - C-SSRS
1. IN THE PAST MONTH, HAVE YOU WISHED YOU WERE DEAD OR WISHED YOU COULD GO TO SLEEP AND NOT WAKE UP?: NO
6. HAVE YOU EVER DONE ANYTHING, STARTED TO DO ANYTHING, OR PREPARED TO DO ANYTHING TO END YOUR LIFE?: NO
2. HAVE YOU ACTUALLY HAD ANY THOUGHTS OF KILLING YOURSELF IN THE PAST MONTH?: NO

## 2024-05-20 NOTE — ED NOTES
Pain slightly better after pain meds and while not moving. Some cramping in the right LB while rolling over. Currently lying prone with ice on LB

## 2024-05-20 NOTE — ED NOTES
Bed: ED23  Expected date:   Expected time:   Means of arrival:   Comments:  [facility] - 49M Back pain

## 2024-05-20 NOTE — ED PROVIDER NOTES
"  Emergency Department Note      History of Present Illness     Chief Complaint  Back Pain    HPI  Primitivo Melchor is a 49 year old male who presents to the ED with a complaint of back pain. Yesterday afternoon, the patient was gardening when he felt a pop in his right back and pain, which gradually got worse. He denied that the pain radiated anywhere and denied any issues with incontincine. He stated that he had a similar issue 5 years ago, but the onset of pain was more gradual and that the pain for this incident was slightly worse. He took some muscle relaxers and painkillers for his last incident,which completely  resolved his pain. He has no history of back surgery. He has no allergies to medications.    Independent Historian  None    Review of External Notes  I reviewed an office visit dated 8/28/2023    Past Medical History   Medical History and Problem List  Gout  Colon Cancer   Alcohol use disorder  Hyperplastic colon polyp  MADAY  Panic disorder  Hypertriglyceridemia  Depression with anxiety     Medications  Lorazepam  Fenofibrate    Surgical History   Umbilical hernia repair  Physical Exam     Patient Vitals for the past 24 hrs:   BP Temp Temp src Pulse Resp SpO2 Height Weight   05/20/24 0852 (!) 166/92 98.6  F (37  C) Temporal 71 14 96 % 1.803 m (5' 11\") 108.9 kg (240 lb)     Physical Exam    General: Alert and Interactive.   Head: No signs of trauma.   Mouth/Throat: Oropharynx is clear and moist.   Eyes: Conjunctivae are normal. Pupils are equal, round, and reactive to light.   Neck: Normal range of motion. No nuchal rigidity.   CV: Normal rate and regular rhythm.    Resp: Effort normal and breath sounds normal. No respiratory distress.   GI: Soft. There is no tenderness or guarding.   MSK: Normal range of motion. no edema.   Neuro: The patient is alert and oriented to person, place, and time.  PERRLA, EOMI, strength in upper/lower extremities normal and symmetrical.   Sensation normal. Speech " normal.  GCS eye subscore is 4. GCS verbal subscore is 5. GCS motor subscore is 6.   Skin: Skin is warm and dry. No rash noted.   Psych: normal mood and affect. behavior is normal.    Diagnostics   Lab Results   Labs Ordered and Resulted from Time of ED Arrival to Time of ED Departure   CBC WITH PLATELETS AND DIFFERENTIAL - Abnormal       Result Value    WBC Count 4.4      RBC Count 4.44      Hemoglobin 13.6      Hematocrit 39.3 (*)     MCV 89      MCH 30.6      MCHC 34.6      RDW 11.5      Platelet Count 149 (*)     % Neutrophils 49      % Lymphocytes 32      % Monocytes 6      % Eosinophils 11      % Basophils 1      % Immature Granulocytes 1      NRBCs per 100 WBC 0      Absolute Neutrophils 2.2      Absolute Lymphocytes 1.4      Absolute Monocytes 0.3      Absolute Eosinophils 0.5      Absolute Basophils 0.0      Absolute Immature Granulocytes 0.0      Absolute NRBCs 0.0     COMPREHENSIVE METABOLIC PANEL       Imaging  No orders to display       ED Course    Medications Administered  Medications   HYDROmorphone (PF) (DILAUDID) injection 0.5 mg (0.5 mg Intravenous $Given 5/20/24 0924)   cyclobenzaprine (FLEXERIL) tablet 10 mg (10 mg Oral $Given 5/20/24 0924)     Discussion of Management  None    Social Determinants of Health adding to complexity of care  Stress/Adjustment Disorders    ED Course  ED Course as of 05/20/24 1020   Mon May 20, 2024   0913 I obtained history and examined the patient as noted above.      Medical Decision Making / Diagnosis     ELVIS  Primitivo Melchor is a 49 year old male who presents for evaluation of back pain that started after working in his yard.  He has history of back pain in the past.  Pain has improved with interventions in the emergency department. The patient did not sustain any trauma, therefore x-rays are not necessary due to the low likelihood of fracture or subluxation.  No red flag symptoms to suggest CT and/or MRI is indicated at this point.  There is no clinical  evidence of cauda equina syndrome, discitis, spinal/epidural space hematoma or epidural abscess or other worrisome etiology. The neurological exam is normal and the patient's symptoms seem consistent with musculoskeletal issues and significant muscle spasm.   The patient will be discharged with medications to use as directed. Ice or heat to the back and stretching exercises. No heavy lifting, bending or twisting. Return if increasing pain, numbness, weakness, or bowel or bladder dysfunction. He was advised to schedule follow-up with his primary doctor within 3 days to re-assess symptoms.      Disposition  The patient was discharged.   The patient was referred to orthopedics for further evaluation and treatment.    ICD-10 Codes:    ICD-10-CM    1. Low back strain, initial encounter  S39.012A            Discharge Medications  Discharge Medication List as of 5/20/2024 11:31 AM      START taking these medications    Details   cyclobenzaprine (FLEXERIL) 5 MG tablet Take 1-2 tablets (5-10 mg) by mouth 3 times daily as needed for muscle spasms, Disp-15 tablet, R-0, Local Print      methylPREDNISolone (MEDROL DOSEPAK) 4 MG tablet therapy pack Follow Package Directions, Disp-21 tablet, R-0, Local Print               Scribe Disclosure:  I, Zayda Hoffman, am serving as a scribe at 9:14 AM on 5/20/2024 to document services personally performed by Clinton Bee MD based on my observations and the provider's statements to me.        Clinton Bee MD  05/21/24 0125

## 2024-05-20 NOTE — ED TRIAGE NOTES
BIBA from home. Doing yard work yesterday and heard a pop in the right center of the LB. No pain in the legs, no change to bowel or bladder. Unable to stand upright. 100 mcg of fentanyl PTA by EMS.     Triage Assessment (Adult)       Row Name 05/20/24 0802          Triage Assessment    Airway WDL WDL        Respiratory WDL    Respiratory WDL WDL        Skin Circulation/Temperature WDL    Skin Circulation/Temperature WDL WDL        Cardiac WDL    Cardiac WDL WDL        Peripheral/Neurovascular WDL    Peripheral Neurovascular WDL WDL        Cognitive/Neuro/Behavioral WDL    Cognitive/Neuro/Behavioral WDL WDL

## 2024-12-07 ENCOUNTER — HEALTH MAINTENANCE LETTER (OUTPATIENT)
Age: 49
End: 2024-12-07